# Patient Record
Sex: FEMALE | Race: OTHER | Employment: UNEMPLOYED | URBAN - METROPOLITAN AREA
[De-identification: names, ages, dates, MRNs, and addresses within clinical notes are randomized per-mention and may not be internally consistent; named-entity substitution may affect disease eponyms.]

---

## 2017-01-05 ENCOUNTER — OFFICE VISIT (OUTPATIENT)
Dept: OBGYN CLINIC | Facility: CLINIC | Age: 37
End: 2017-01-05

## 2017-01-05 VITALS — SYSTOLIC BLOOD PRESSURE: 100 MMHG | DIASTOLIC BLOOD PRESSURE: 62 MMHG | WEIGHT: 152 LBS | BODY MASS INDEX: 27 KG/M2

## 2017-01-05 DIAGNOSIS — O02.0 BLIGHTED OVUM: Primary | ICD-10-CM

## 2017-01-05 PROCEDURE — 90471 IMMUNIZATION ADMIN: CPT | Performed by: OBSTETRICS & GYNECOLOGY

## 2017-01-05 PROCEDURE — 99213 OFFICE O/P EST LOW 20 MIN: CPT | Performed by: OBSTETRICS & GYNECOLOGY

## 2017-01-05 PROCEDURE — 90686 IIV4 VACC NO PRSV 0.5 ML IM: CPT | Performed by: OBSTETRICS & GYNECOLOGY

## 2017-01-06 NOTE — PROGRESS NOTES
HPI:    Patient ID: Nawaf Duarte is a 39year old female. HPI  Patient here for F/U after Blighted ovum. [place] is almost negative. No further bleeding now. Desires to conceive soon. All questions answered. May continue with PNV.   This is a 20 minute

## 2017-01-16 ENCOUNTER — TELEPHONE (OUTPATIENT)
Dept: OBGYN CLINIC | Facility: CLINIC | Age: 37
End: 2017-01-16

## 2017-01-16 DIAGNOSIS — Z34.90 EARLY STAGE OF PREGNANCY: Primary | ICD-10-CM

## 2017-01-16 NOTE — TELEPHONE ENCOUNTER
Pt had a miscarriage about a month ago and just recently found out she is pregnant again. States she is suppose to have some test done right away.  Pls adv

## 2017-01-17 ENCOUNTER — APPOINTMENT (OUTPATIENT)
Dept: LAB | Facility: HOSPITAL | Age: 37
End: 2017-01-17
Attending: OBSTETRICS & GYNECOLOGY
Payer: COMMERCIAL

## 2017-01-17 DIAGNOSIS — Z34.90 EARLY STAGE OF PREGNANCY: ICD-10-CM

## 2017-01-17 LAB
B-HCG SERPL-ACNC: 86.2 MIU/ML
RH BLOOD TYPE: POSITIVE

## 2017-01-17 PROCEDURE — 84702 CHORIONIC GONADOTROPIN TEST: CPT

## 2017-01-17 PROCEDURE — 86901 BLOOD TYPING SEROLOGIC RH(D): CPT

## 2017-01-17 PROCEDURE — 86900 BLOOD TYPING SEROLOGIC ABO: CPT

## 2017-01-17 PROCEDURE — 36415 COLL VENOUS BLD VENIPUNCTURE: CPT

## 2017-01-18 DIAGNOSIS — Z34.90 EARLY STAGE OF PREGNANCY: Primary | ICD-10-CM

## 2017-01-24 ENCOUNTER — APPOINTMENT (OUTPATIENT)
Dept: LAB | Facility: HOSPITAL | Age: 37
End: 2017-01-24
Attending: OBSTETRICS & GYNECOLOGY
Payer: COMMERCIAL

## 2017-01-24 DIAGNOSIS — Z34.90 EARLY STAGE OF PREGNANCY: ICD-10-CM

## 2017-01-24 LAB — B-HCG SERPL-ACNC: 879.8 MIU/ML

## 2017-01-24 PROCEDURE — 36415 COLL VENOUS BLD VENIPUNCTURE: CPT

## 2017-01-24 PROCEDURE — 84702 CHORIONIC GONADOTROPIN TEST: CPT

## 2017-01-25 ENCOUNTER — TELEPHONE (OUTPATIENT)
Dept: OBGYN CLINIC | Facility: CLINIC | Age: 37
End: 2017-01-25

## 2017-01-25 DIAGNOSIS — Z34.90 EARLY STAGE OF PREGNANCY: Primary | ICD-10-CM

## 2017-01-25 NOTE — TELEPHONE ENCOUNTER
Spoke to patient, informed patient per MLM. HCG yesterday was 1 which looks very good. Patient informed she is to have a repeat tomorrow around the same time of the day. Order placed in system.  Patient agrees with plan patient verbally understands

## 2017-01-25 NOTE — TELEPHONE ENCOUNTER
----- Message from Olivia Schroeder MD sent at 1/25/2017  8:48 AM CST -----  Quant BHCG is 879 yesterday. This looks vwery good. Have patient repeat Quant BHCG tomorrow around the same time of day.

## 2017-01-26 ENCOUNTER — APPOINTMENT (OUTPATIENT)
Dept: LAB | Facility: HOSPITAL | Age: 37
End: 2017-01-26
Attending: OBSTETRICS & GYNECOLOGY
Payer: COMMERCIAL

## 2017-01-26 DIAGNOSIS — Z34.90 EARLY STAGE OF PREGNANCY: ICD-10-CM

## 2017-01-26 LAB — B-HCG SERPL-ACNC: 2189 MIU/ML

## 2017-01-26 PROCEDURE — 84702 CHORIONIC GONADOTROPIN TEST: CPT

## 2017-01-26 PROCEDURE — 36415 COLL VENOUS BLD VENIPUNCTURE: CPT

## 2017-01-30 ENCOUNTER — TELEPHONE (OUTPATIENT)
Dept: OBGYN CLINIC | Facility: CLINIC | Age: 37
End: 2017-01-30

## 2017-01-30 DIAGNOSIS — Z36.87 UNSURE OF LMP (LAST MENSTRUAL PERIOD) AS REASON FOR ULTRASOUND SCAN: Primary | ICD-10-CM

## 2017-01-31 NOTE — TELEPHONE ENCOUNTER
----- Message from Danie Gaming MD sent at 1/26/2017  9:17 PM CST -----  Patient informed that Silvia Angelina has doubled. Please send in order for OB U/S for Unsure Dates. Call patient and give her Phone number to U/S Dept.   She is to schedule OB U/S s

## 2017-02-03 ENCOUNTER — APPOINTMENT (OUTPATIENT)
Dept: LAB | Facility: HOSPITAL | Age: 37
End: 2017-02-03
Attending: OBSTETRICS & GYNECOLOGY
Payer: COMMERCIAL

## 2017-02-03 ENCOUNTER — HOSPITAL ENCOUNTER (OUTPATIENT)
Dept: ULTRASOUND IMAGING | Age: 37
Discharge: HOME | End: 2017-02-03
Attending: OBSTETRICS & GYNECOLOGY
Payer: COMMERCIAL

## 2017-02-03 ENCOUNTER — TELEPHONE (OUTPATIENT)
Dept: OBGYN CLINIC | Facility: CLINIC | Age: 37
End: 2017-02-03

## 2017-02-03 DIAGNOSIS — O20.0 THREATENED ABORTION, ANTEPARTUM: Primary | ICD-10-CM

## 2017-02-03 DIAGNOSIS — Z34.90 EARLY STAGE OF PREGNANCY: ICD-10-CM

## 2017-02-03 DIAGNOSIS — Z36.87 UNSURE OF LMP (LAST MENSTRUAL PERIOD) AS REASON FOR ULTRASOUND SCAN: ICD-10-CM

## 2017-02-03 LAB — B-HCG SERPL-ACNC: NORMAL MIU/ML

## 2017-02-03 PROCEDURE — 84702 CHORIONIC GONADOTROPIN TEST: CPT

## 2017-02-03 PROCEDURE — 76801 OB US < 14 WKS SINGLE FETUS: CPT

## 2017-02-03 PROCEDURE — 36415 COLL VENOUS BLD VENIPUNCTURE: CPT

## 2017-02-03 PROCEDURE — 76817 TRANSVAGINAL US OBSTETRIC: CPT

## 2017-02-03 NOTE — TELEPHONE ENCOUNTER
Patient informed to go to the lab for a Quant BHCG. Verbalizes understanding and says she will go do this.

## 2017-02-03 NOTE — TELEPHONE ENCOUNTER
The report is not available yet but send the patient to the lab for a Quant BHCG. Diagnosis is early pregnancy.

## 2017-02-05 ENCOUNTER — LAB ENCOUNTER (OUTPATIENT)
Dept: LAB | Facility: HOSPITAL | Age: 37
End: 2017-02-05
Attending: OBSTETRICS & GYNECOLOGY
Payer: COMMERCIAL

## 2017-02-05 DIAGNOSIS — Z34.90 EARLY STAGE OF PREGNANCY: ICD-10-CM

## 2017-02-05 LAB — B-HCG SERPL-ACNC: NORMAL MIU/ML

## 2017-02-05 PROCEDURE — 36415 COLL VENOUS BLD VENIPUNCTURE: CPT

## 2017-02-05 PROCEDURE — 84702 CHORIONIC GONADOTROPIN TEST: CPT

## 2017-02-06 ENCOUNTER — TELEPHONE (OUTPATIENT)
Dept: OBGYN CLINIC | Facility: CLINIC | Age: 37
End: 2017-02-06

## 2017-02-06 DIAGNOSIS — Z34.90 EARLY STAGE OF PREGNANCY: Primary | ICD-10-CM

## 2017-02-06 NOTE — TELEPHONE ENCOUNTER
----- Message from Deon Garces MD sent at 2/3/2017  6:38 PM CST -----  Patient informed that Medina Benitez has risen. Please place another order for a Quant BHCG. Patient to go Sunday to lab. Call patient once order is sent.

## 2017-02-07 ENCOUNTER — TELEPHONE (OUTPATIENT)
Dept: OBGYN CLINIC | Facility: CLINIC | Age: 37
End: 2017-02-07

## 2017-02-13 ENCOUNTER — HOSPITAL ENCOUNTER (OUTPATIENT)
Dept: ULTRASOUND IMAGING | Facility: HOSPITAL | Age: 37
Discharge: HOME | End: 2017-02-13
Attending: OBSTETRICS & GYNECOLOGY
Payer: COMMERCIAL

## 2017-02-13 DIAGNOSIS — Z34.90 EARLY STAGE OF PREGNANCY: ICD-10-CM

## 2017-02-13 PROCEDURE — 76817 TRANSVAGINAL US OBSTETRIC: CPT

## 2017-02-13 PROCEDURE — 76801 OB US < 14 WKS SINGLE FETUS: CPT

## 2017-02-14 ENCOUNTER — TELEPHONE (OUTPATIENT)
Dept: OBGYN CLINIC | Facility: CLINIC | Age: 37
End: 2017-02-14

## 2017-02-14 DIAGNOSIS — O20.0 THREATENED ABORTION, ANTEPARTUM: Primary | ICD-10-CM

## 2017-02-14 NOTE — TELEPHONE ENCOUNTER
Per pt is worried regarding the u/s result. Had a miscarriage in December and has a trip planned for Saturday. Requesting to speak to [facility] to ask further questions.  Per pt preferred number is [phone]

## 2017-02-14 NOTE — TELEPHONE ENCOUNTER
Notes Recorded by Elizabeth Kent MD on 2/13/2017 at 9:12 PM  Patient informed of OB U/S reports.   FHTs + 91 and a small subchorionic bleeding noted.  Please send in order for another F/U OB U/S.  Patient to schedule F/U U/S when she comes back from her

## 2017-02-14 NOTE — TELEPHONE ENCOUNTER
6 WEEKS PREGNANT, PT WOULD LIKE TO SPEAK TO THE NURSE REGARDING THE U/S TEST RESULTS, PT IS VERY CONCERN

## 2017-02-21 ENCOUNTER — HOSPITAL ENCOUNTER (OUTPATIENT)
Dept: ULTRASOUND IMAGING | Facility: HOSPITAL | Age: 37
Discharge: HOME | End: 2017-02-21
Attending: OBSTETRICS & GYNECOLOGY
Payer: COMMERCIAL

## 2017-02-21 ENCOUNTER — TELEPHONE (OUTPATIENT)
Dept: OBGYN CLINIC | Facility: CLINIC | Age: 37
End: 2017-02-21

## 2017-02-21 DIAGNOSIS — O20.0 THREATENED ABORTION, ANTEPARTUM: ICD-10-CM

## 2017-02-21 PROCEDURE — 76801 OB US < 14 WKS SINGLE FETUS: CPT | Performed by: RADIOLOGY

## 2017-02-21 PROCEDURE — 76817 TRANSVAGINAL US OBSTETRIC: CPT | Performed by: RADIOLOGY

## 2017-02-22 ENCOUNTER — TELEPHONE (OUTPATIENT)
Dept: OBGYN CLINIC | Facility: CLINIC | Age: 37
End: 2017-02-22

## 2017-02-22 DIAGNOSIS — O20.0 THREATENED ABORTION, ANTEPARTUM: Primary | ICD-10-CM

## 2017-02-22 NOTE — TELEPHONE ENCOUNTER
----- Message from Marceline Denver, MD sent at 2/22/2017  8:25 AM CST -----  Spoke with patient about OB U/S results. Please order another F/U OB U/S for the patient to do in one week. Diagnosis is Threatened AB.   Call patient to let her know order plac

## 2017-02-22 NOTE — TELEPHONE ENCOUNTER
----- Message from Logan Salgado MD sent at 2/22/2017  8:25 AM CST -----  Spoke with patient about OB U/S results. Please order another F/U OB U/S for the patient to do in one week. Diagnosis is Threatened AB.   Call patient to let her know order plac

## 2017-02-24 ENCOUNTER — TELEPHONE (OUTPATIENT)
Dept: OBGYN CLINIC | Facility: CLINIC | Age: 37
End: 2017-02-24

## 2017-02-24 DIAGNOSIS — O20.0 THREATENED MISCARRIAGE: Primary | ICD-10-CM

## 2017-02-25 ENCOUNTER — APPOINTMENT (OUTPATIENT)
Dept: LAB | Facility: HOSPITAL | Age: 37
End: 2017-02-25
Attending: OBSTETRICS & GYNECOLOGY
Payer: COMMERCIAL

## 2017-02-25 DIAGNOSIS — O20.0 THREATENED MISCARRIAGE: ICD-10-CM

## 2017-02-25 LAB
B-HCG SERPL-ACNC: NORMAL MIU/ML
PROGEST SERPL-MCNC: 10.5 NG/ML

## 2017-02-25 PROCEDURE — 84144 ASSAY OF PROGESTERONE: CPT

## 2017-02-25 PROCEDURE — 84702 CHORIONIC GONADOTROPIN TEST: CPT

## 2017-02-25 PROCEDURE — 36415 COLL VENOUS BLD VENIPUNCTURE: CPT

## 2017-02-25 NOTE — TELEPHONE ENCOUNTER
Pt informed of MLM reply, verbalized understanding. Stated ever since she found out she could have another miscarriage, has been very anxious, crying a lot, slept very little. Still having pregnancy symptoms, breast tenderness, nausea.  First miscarriage

## 2017-02-25 NOTE — TELEPHONE ENCOUNTER
The patient did NOT miscarry. There is still a heart beat. I will call her when I see the next U/S in my Results In-Basket.

## 2017-02-28 ENCOUNTER — HOSPITAL ENCOUNTER (OUTPATIENT)
Dept: ULTRASOUND IMAGING | Facility: HOSPITAL | Age: 37
Discharge: HOME | End: 2017-02-28
Attending: OBSTETRICS & GYNECOLOGY
Payer: COMMERCIAL

## 2017-02-28 ENCOUNTER — OFFICE VISIT (OUTPATIENT)
Dept: OBGYN CLINIC | Facility: CLINIC | Age: 37
End: 2017-02-28

## 2017-02-28 ENCOUNTER — TELEPHONE (OUTPATIENT)
Dept: OBGYN CLINIC | Facility: CLINIC | Age: 37
End: 2017-02-28

## 2017-02-28 VITALS — DIASTOLIC BLOOD PRESSURE: 60 MMHG | SYSTOLIC BLOOD PRESSURE: 96 MMHG

## 2017-02-28 DIAGNOSIS — O20.0 THREATENED ABORTION, ANTEPARTUM: Primary | ICD-10-CM

## 2017-02-28 DIAGNOSIS — O20.0 THREATENED ABORTION, ANTEPARTUM: ICD-10-CM

## 2017-02-28 DIAGNOSIS — O02.1 MISSED ABORTION: Primary | ICD-10-CM

## 2017-02-28 PROCEDURE — 99213 OFFICE O/P EST LOW 20 MIN: CPT | Performed by: OBSTETRICS & GYNECOLOGY

## 2017-02-28 PROCEDURE — 76817 TRANSVAGINAL US OBSTETRIC: CPT

## 2017-02-28 PROCEDURE — 76801 OB US < 14 WKS SINGLE FETUS: CPT

## 2017-02-28 NOTE — TELEPHONE ENCOUNTER
Schedule patient for Suction D & C for Missed Ab for Friday, 3/3/2017. To follow my scheduled case. Remind patient to go to lab the day prior to surgery and to be NPO after midnight.

## 2017-02-28 NOTE — TELEPHONE ENCOUNTER
Starting bleeding bright red this am, like start of period, also having mild back pain. Had apt for u/s then MLM later today, would like to know what to do.      Informed MLM in office, advised pt could have STAT HOLD and call for this am and f/u with him

## 2017-02-28 NOTE — PROGRESS NOTES
HPI:    Patient ID: Ted Moncada is a 39year old female. HPI  Patient here after Hold and Call U/S that confirms a Missed AB. Options reviewed of conservative management vs Suction D & C.   Risks of surgery reviewed including but not limited to injury t alert and oriented to person, place, and time. Skin: Skin is warm and dry. Psychiatric: She has a normal mood and affect. Her behavior is normal. Judgment and thought content normal.   Nursing note and vitals reviewed. Uterus 6 weeks size.            A

## 2017-02-28 NOTE — TELEPHONE ENCOUNTER
Surgery scheduled 3/3/17 following 10:30 case, charted  In the surgery book, lm for a patient to inform of the surgery date and time

## 2017-03-02 ENCOUNTER — TELEPHONE (OUTPATIENT)
Dept: OBGYN CLINIC | Facility: CLINIC | Age: 37
End: 2017-03-02

## 2017-03-02 NOTE — TELEPHONE ENCOUNTER
I contacted patient to return her phone call. Patient has a known 6 week size missed AB. Patient has been having some cramping and spotting with brownish discharge as well.   Discussed spontaneous AB and the option of following conservatively versus a D&C

## 2017-03-02 NOTE — TELEPHONE ENCOUNTER
Pt diagnosed with miscarriage 2/28, scheduled for [facility]  for tomorrow. Pt started today with a little bit of of cramping, 5-7/10 comes and goes, some bleeding yesterday, bright red, a little blood today.  Pt was going to take ibuprofen for pain but was able to

## 2017-03-02 NOTE — TELEPHONE ENCOUNTER
May call OR to cancel D & C for tomorrow but reschedule it for next Wednesday. We can always cancel it again if the patient ends up passing miscarriage spontaneously.

## 2017-03-06 ENCOUNTER — TELEPHONE (OUTPATIENT)
Dept: OBGYN CLINIC | Facility: CLINIC | Age: 37
End: 2017-03-06

## 2017-03-06 DIAGNOSIS — O02.1 MISSED ABORTION: Primary | ICD-10-CM

## 2017-03-06 NOTE — TELEPHONE ENCOUNTER
PT STATE SHE'S HAVING A MISCARRIAGE / SHE WAS TOLD IF SHE DID NOT HAVE IT BY THE WEEK END / TO CALL  BACK / PLS ADV

## 2017-03-07 ENCOUNTER — LAB ENCOUNTER (OUTPATIENT)
Dept: LAB | Facility: HOSPITAL | Age: 37
End: 2017-03-07
Attending: OBSTETRICS & GYNECOLOGY
Payer: COMMERCIAL

## 2017-03-07 DIAGNOSIS — Z34.90 EARLY STAGE OF PREGNANCY: ICD-10-CM

## 2017-03-07 DIAGNOSIS — O02.1 MISSED ABORTION: ICD-10-CM

## 2017-03-07 LAB
ANTIBODY SCREEN: NEGATIVE
B-HCG SERPL-ACNC: NORMAL MIU/ML
BASOPHILS # BLD: 0 K/UL (ref 0–0.2)
BASOPHILS NFR BLD: 0 %
EOSINOPHIL # BLD: 0.1 K/UL (ref 0–0.7)
EOSINOPHIL NFR BLD: 1 %
ERYTHROCYTE [DISTWIDTH] IN BLOOD BY AUTOMATED COUNT: 11.7 % (ref 11–15)
HCT VFR BLD AUTO: 40.8 % (ref 35–48)
HGB BLD-MCNC: 14 G/DL (ref 12–16)
LYMPHOCYTES # BLD: 2.4 K/UL (ref 1–4)
LYMPHOCYTES NFR BLD: 31 %
MCH RBC QN AUTO: 31.8 PG (ref 27–32)
MCHC RBC AUTO-ENTMCNC: 34.2 G/DL (ref 32–37)
MCV RBC AUTO: 92.8 FL (ref 80–100)
MONOCYTES # BLD: 0.4 K/UL (ref 0–1)
MONOCYTES NFR BLD: 6 %
NEUTROPHILS # BLD AUTO: 4.8 K/UL (ref 1.8–7.7)
NEUTROPHILS NFR BLD: 62 %
PLATELET # BLD AUTO: 237 K/UL (ref 140–400)
PMV BLD AUTO: 9.2 FL (ref 7.4–10.3)
RBC # BLD AUTO: 4.39 M/UL (ref 3.7–5.4)
RH BLOOD TYPE: POSITIVE
WBC # BLD AUTO: 7.8 K/UL (ref 4–11)

## 2017-03-07 PROCEDURE — 86901 BLOOD TYPING SEROLOGIC RH(D): CPT

## 2017-03-07 PROCEDURE — 86850 RBC ANTIBODY SCREEN: CPT

## 2017-03-07 PROCEDURE — 36415 COLL VENOUS BLD VENIPUNCTURE: CPT

## 2017-03-07 PROCEDURE — 84702 CHORIONIC GONADOTROPIN TEST: CPT

## 2017-03-07 PROCEDURE — 85025 COMPLETE CBC W/AUTO DIFF WBC: CPT

## 2017-03-07 PROCEDURE — 86900 BLOOD TYPING SEROLOGIC ABO: CPT

## 2017-03-07 NOTE — H&P
[facility]    PATIENT'S NAME: Lucho Walker   ATTENDING PHYSICIAN: Zeeshan Esteban MD   PATIENT ACCOUNT#:   [id number]    LOCATION:  [facility]  MEDICAL RECORD #:   [id number]       YOB: 1980  ADMISSION DATE:       03/08/2017 adenopathy. ABDOMEN:  Soft, nontender. No hepatosplenomegaly. EXTREMITIES:  Lower extremities with no edema. No calf tenderness. PELVIC:  Normal external genitalia. Vagina no lesions, scant bloody discharge. Cervix is closed.   No cervical motion te

## 2017-03-07 NOTE — TELEPHONE ENCOUNTER
Rescheduled suction D&C for tomorrow at 1230, Patient informed and verbalized understanding. Form faxed, placed in book   Pt requested u/s just to make sure, okayed by mlm, ordered, scheduled for 4pm, drink 32oz starting at 230 and hold, diagnostics main.

## 2017-03-08 ENCOUNTER — HOSPITAL ENCOUNTER (OUTPATIENT)
Facility: HOSPITAL | Age: 37
Setting detail: HOSPITAL OUTPATIENT SURGERY
Discharge: HOME | End: 2017-03-08
Attending: OBSTETRICS & GYNECOLOGY | Admitting: OBSTETRICS & GYNECOLOGY
Payer: COMMERCIAL

## 2017-03-08 ENCOUNTER — ANESTHESIA EVENT (OUTPATIENT)
Dept: SURGERY | Facility: HOSPITAL | Age: 37
End: 2017-03-08
Payer: COMMERCIAL

## 2017-03-08 ENCOUNTER — ANESTHESIA (OUTPATIENT)
Dept: SURGERY | Facility: HOSPITAL | Age: 37
End: 2017-03-08
Payer: COMMERCIAL

## 2017-03-08 ENCOUNTER — SURGERY (OUTPATIENT)
Age: 37
End: 2017-03-08

## 2017-03-08 VITALS
RESPIRATION RATE: 16 BRPM | WEIGHT: 138 LBS | TEMPERATURE: 98 F | HEIGHT: 64 IN | OXYGEN SATURATION: 99 % | BODY MASS INDEX: 23.56 KG/M2 | HEART RATE: 62 BPM | SYSTOLIC BLOOD PRESSURE: 113 MMHG | DIASTOLIC BLOOD PRESSURE: 66 MMHG

## 2017-03-08 DIAGNOSIS — O02.1 MISSED ABORTION: Primary | ICD-10-CM

## 2017-03-08 PROCEDURE — 59820 CARE OF MISCARRIAGE: CPT | Performed by: OBSTETRICS & GYNECOLOGY

## 2017-03-08 PROCEDURE — 10D17ZZ EXTRACTION OF PRODUCTS OF CONCEPTION, RETAINED, VIA NATURAL OR ARTIFICIAL OPENING: ICD-10-PCS | Performed by: OBSTETRICS & GYNECOLOGY

## 2017-03-08 RX ORDER — LIDOCAINE HYDROCHLORIDE 10 MG/ML
INJECTION, SOLUTION EPIDURAL; INFILTRATION; INTRACAUDAL; PERINEURAL AS NEEDED
Status: DISCONTINUED | OUTPATIENT
Start: 2017-03-08 | End: 2017-03-08 | Stop reason: SURG

## 2017-03-08 RX ORDER — HYDROMORPHONE HYDROCHLORIDE 1 MG/ML
0.2 INJECTION, SOLUTION INTRAMUSCULAR; INTRAVENOUS; SUBCUTANEOUS EVERY 5 MIN PRN
Status: DISCONTINUED | OUTPATIENT
Start: 2017-03-08 | End: 2017-03-08

## 2017-03-08 RX ORDER — HYDROMORPHONE HYDROCHLORIDE 1 MG/ML
0.6 INJECTION, SOLUTION INTRAMUSCULAR; INTRAVENOUS; SUBCUTANEOUS EVERY 5 MIN PRN
Status: DISCONTINUED | OUTPATIENT
Start: 2017-03-08 | End: 2017-03-08

## 2017-03-08 RX ORDER — FAMOTIDINE 20 MG/1
20 TABLET ORAL ONCE
Status: DISCONTINUED | OUTPATIENT
Start: 2017-03-08 | End: 2017-03-08 | Stop reason: HOSPADM

## 2017-03-08 RX ORDER — DEXAMETHASONE SODIUM PHOSPHATE 4 MG/ML
VIAL (ML) INJECTION AS NEEDED
Status: DISCONTINUED | OUTPATIENT
Start: 2017-03-08 | End: 2017-03-08 | Stop reason: SURG

## 2017-03-08 RX ORDER — ONDANSETRON 4 MG/1
4 TABLET, FILM COATED ORAL EVERY 8 HOURS PRN
Status: DISCONTINUED | OUTPATIENT
Start: 2017-03-08 | End: 2017-03-08

## 2017-03-08 RX ORDER — MISOPROSTOL 200 UG/1
TABLET ORAL AS NEEDED
Status: DISCONTINUED | OUTPATIENT
Start: 2017-03-08 | End: 2017-03-08

## 2017-03-08 RX ORDER — HYDROCODONE BITARTRATE AND ACETAMINOPHEN 5; 325 MG/1; MG/1
1 TABLET ORAL EVERY 6 HOURS PRN
Qty: 30 TABLET | Refills: 0 | Status: SHIPPED | OUTPATIENT
Start: 2017-03-08 | End: 2017-06-30

## 2017-03-08 RX ORDER — MORPHINE SULFATE 4 MG/ML
4 INJECTION, SOLUTION INTRAMUSCULAR; INTRAVENOUS EVERY 10 MIN PRN
Status: DISCONTINUED | OUTPATIENT
Start: 2017-03-08 | End: 2017-03-08

## 2017-03-08 RX ORDER — HYDROMORPHONE HYDROCHLORIDE 1 MG/ML
0.4 INJECTION, SOLUTION INTRAMUSCULAR; INTRAVENOUS; SUBCUTANEOUS EVERY 5 MIN PRN
Status: DISCONTINUED | OUTPATIENT
Start: 2017-03-08 | End: 2017-03-08

## 2017-03-08 RX ORDER — SODIUM CHLORIDE, SODIUM LACTATE, POTASSIUM CHLORIDE, CALCIUM CHLORIDE 600; 310; 30; 20 MG/100ML; MG/100ML; MG/100ML; MG/100ML
INJECTION, SOLUTION INTRAVENOUS CONTINUOUS
Status: DISCONTINUED | OUTPATIENT
Start: 2017-03-08 | End: 2017-03-08

## 2017-03-08 RX ORDER — MORPHINE SULFATE 10 MG/ML
6 INJECTION, SOLUTION INTRAMUSCULAR; INTRAVENOUS EVERY 10 MIN PRN
Status: DISCONTINUED | OUTPATIENT
Start: 2017-03-08 | End: 2017-03-08

## 2017-03-08 RX ORDER — ONDANSETRON 2 MG/ML
4 INJECTION INTRAMUSCULAR; INTRAVENOUS ONCE AS NEEDED
Status: DISCONTINUED | OUTPATIENT
Start: 2017-03-08 | End: 2017-03-08

## 2017-03-08 RX ORDER — OXYTOCIN 10 [USP'U]/ML
INJECTION, SOLUTION INTRAMUSCULAR; INTRAVENOUS AS NEEDED
Status: DISCONTINUED | OUTPATIENT
Start: 2017-03-08 | End: 2017-03-08 | Stop reason: SURG

## 2017-03-08 RX ORDER — IBUPROFEN 600 MG/1
600 TABLET ORAL EVERY 6 HOURS PRN
Qty: 30 TABLET | Refills: 0 | Status: SHIPPED | OUTPATIENT
Start: 2017-03-08 | End: 2017-06-30

## 2017-03-08 RX ORDER — SODIUM CHLORIDE 0.9 % (FLUSH) 0.9 %
10 SYRINGE (ML) INJECTION AS NEEDED
Status: DISCONTINUED | OUTPATIENT
Start: 2017-03-08 | End: 2017-03-08 | Stop reason: HOSPADM

## 2017-03-08 RX ORDER — KETOROLAC TROMETHAMINE 30 MG/ML
INJECTION, SOLUTION INTRAMUSCULAR; INTRAVENOUS AS NEEDED
Status: DISCONTINUED | OUTPATIENT
Start: 2017-03-08 | End: 2017-03-08 | Stop reason: SURG

## 2017-03-08 RX ORDER — NALOXONE HYDROCHLORIDE 0.4 MG/ML
80 INJECTION, SOLUTION INTRAMUSCULAR; INTRAVENOUS; SUBCUTANEOUS AS NEEDED
Status: DISCONTINUED | OUTPATIENT
Start: 2017-03-08 | End: 2017-03-08

## 2017-03-08 RX ORDER — MORPHINE SULFATE 2 MG/ML
2 INJECTION, SOLUTION INTRAMUSCULAR; INTRAVENOUS EVERY 10 MIN PRN
Status: DISCONTINUED | OUTPATIENT
Start: 2017-03-08 | End: 2017-03-08

## 2017-03-08 RX ORDER — HALOPERIDOL 5 MG/ML
0.25 INJECTION INTRAMUSCULAR ONCE AS NEEDED
Status: DISCONTINUED | OUTPATIENT
Start: 2017-03-08 | End: 2017-03-08

## 2017-03-08 RX ORDER — HYDROCODONE BITARTRATE AND ACETAMINOPHEN 5; 325 MG/1; MG/1
1 TABLET ORAL AS NEEDED
Status: DISCONTINUED | OUTPATIENT
Start: 2017-03-08 | End: 2017-03-08

## 2017-03-08 RX ORDER — ACETAMINOPHEN 325 MG/1
650 TABLET ORAL ONCE
Status: COMPLETED | OUTPATIENT
Start: 2017-03-08 | End: 2017-03-08

## 2017-03-08 RX ORDER — METOCLOPRAMIDE 10 MG/1
10 TABLET ORAL ONCE
Status: DISCONTINUED | OUTPATIENT
Start: 2017-03-08 | End: 2017-03-08 | Stop reason: HOSPADM

## 2017-03-08 RX ORDER — MIDAZOLAM HYDROCHLORIDE 1 MG/ML
INJECTION INTRAMUSCULAR; INTRAVENOUS AS NEEDED
Status: DISCONTINUED | OUTPATIENT
Start: 2017-03-08 | End: 2017-03-08 | Stop reason: SURG

## 2017-03-08 RX ORDER — ONDANSETRON 2 MG/ML
4 INJECTION INTRAMUSCULAR; INTRAVENOUS EVERY 8 HOURS PRN
Status: DISCONTINUED | OUTPATIENT
Start: 2017-03-08 | End: 2017-03-08

## 2017-03-08 RX ORDER — HYDROCODONE BITARTRATE AND ACETAMINOPHEN 5; 325 MG/1; MG/1
2 TABLET ORAL AS NEEDED
Status: DISCONTINUED | OUTPATIENT
Start: 2017-03-08 | End: 2017-03-08

## 2017-03-08 RX ADMIN — LIDOCAINE HYDROCHLORIDE 50 MG: 10 INJECTION, SOLUTION EPIDURAL; INFILTRATION; INTRACAUDAL; PERINEURAL at 13:45:00

## 2017-03-08 RX ADMIN — OXYTOCIN 10 UNITS: 10 INJECTION, SOLUTION INTRAMUSCULAR; INTRAVENOUS at 14:02:00

## 2017-03-08 RX ADMIN — DEXAMETHASONE SODIUM PHOSPHATE 4 MG: 4 MG/ML VIAL (ML) INJECTION at 13:45:00

## 2017-03-08 RX ADMIN — MIDAZOLAM HYDROCHLORIDE 2 MG: 1 INJECTION INTRAMUSCULAR; INTRAVENOUS at 13:39:00

## 2017-03-08 RX ADMIN — SODIUM CHLORIDE, SODIUM LACTATE, POTASSIUM CHLORIDE, CALCIUM CHLORIDE: 600; 310; 30; 20 INJECTION, SOLUTION INTRAVENOUS at 14:31:00

## 2017-03-08 RX ADMIN — KETOROLAC TROMETHAMINE 30 MG: 30 INJECTION, SOLUTION INTRAMUSCULAR; INTRAVENOUS at 14:14:00

## 2017-03-08 RX ADMIN — SODIUM CHLORIDE, SODIUM LACTATE, POTASSIUM CHLORIDE, CALCIUM CHLORIDE: 600; 310; 30; 20 INJECTION, SOLUTION INTRAVENOUS at 14:30:00

## 2017-03-08 NOTE — BRIEF OP NOTE
[facility]  Brief Op Note     Simeon Solis Location: OR   [facility] [id number] MRN [id number]   Admission Date 3/8/2017 Operation Date 3/8/2017   Attending Physician Abhijit Chua MD Operating Physician Ming Sampson MD       Pre-Op

## 2017-03-08 NOTE — OPERATIVE REPORT
[facility]    PATIENT'S NAME: Ellismaria r Castle   ATTENDING PHYSICIAN: Zeeshan Bailey MD   OPERATING PHYSICIAN: Logan Bailey MD   PATIENT ACCOUNT#:   [id number]    LOCATION:  [facility]  MEDICAL RECORD #:   [id number]       DATE OF mcg of Cytotec was placed per rectum to help with uterine constriction. The patient was then transported to the recovery room in stable postoperative condition. Dictated By Elizabeth Taylor MD  d: 03/08/2017 14:29:09  t: 03/08/2017 16:17:59  Job [id number] [phone] [id number]

## 2017-03-08 NOTE — INTERVAL H&P NOTE
Pre-op Diagnosis: Missed     The above referenced H&P was reviewed by Melanie Flores. Jia Wadsworth MD on 3/8/2017, the patient was examined and no significant changes have occurred in the patient's condition since the H&P was performed.   I discussed with the

## 2017-03-08 NOTE — ANESTHESIA POSTPROCEDURE EVALUATION
Patient: Jane Darling    Procedure Summary     Date Anesthesia Start Anesthesia Stop Room / Location    03/08/17 1339  [facility] MAIN OR 01 / [facility] MAIN OR       Procedure Diagnosis Surgeon Responsible Provider    DILATION & CURETTAGE SUCTION (N/A Uterus) (missed a

## 2017-03-08 NOTE — ANESTHESIA PREPROCEDURE EVALUATION
Anesthesia PreOp Note    HPI:     Zina Sanches is a 39year old female who presents for preoperative consultation requested by: Jet Zepeda MD    Date of Surgery: 3/8/2017    Procedure(s):  DILATION & CURETTAGE SUCTION  Indication: Missed  Catracho Galarza MD      No current [facility]-ordered outpatient prescriptions on file.       Spinach                 Diarrhea    Family History   Problem Relation Age of Onset   • Thyroid disease Mother    • Lipids Father      hyperlipidemia   • Lipids Other Endo/Other - negative ROS   Abdominal              Anesthesia Plan:   ASA:  2  Plan:   General  Post-op Pain Management: IV analgesics  Informed Consent Plan and Risks Discussed With:  Patient      I have informed Timothy Porter  of the nature of the anesthet

## 2017-03-10 ENCOUNTER — TELEPHONE (OUTPATIENT)
Dept: OBGYN CLINIC | Facility: CLINIC | Age: 37
End: 2017-03-10

## 2017-03-10 NOTE — TELEPHONE ENCOUNTER
The pt set up an appt for a d&c f/up on 3/27/17 in the [facility] location, but she has a few questions. Please advise.

## 2017-03-10 NOTE — TELEPHONE ENCOUNTER
Pt informed okay to have at [facility], no prep needed

## 2017-03-27 ENCOUNTER — OFFICE VISIT (OUTPATIENT)
Dept: FAMILY MEDICINE CLINIC | Facility: CLINIC | Age: 37
End: 2017-03-27

## 2017-03-27 ENCOUNTER — OFFICE VISIT (OUTPATIENT)
Dept: OBGYN CLINIC | Facility: CLINIC | Age: 37
End: 2017-03-27

## 2017-03-27 VITALS
SYSTOLIC BLOOD PRESSURE: 94 MMHG | HEIGHT: 62.5 IN | TEMPERATURE: 97 F | RESPIRATION RATE: 16 BRPM | DIASTOLIC BLOOD PRESSURE: 62 MMHG | HEART RATE: 74 BPM | WEIGHT: 141 LBS | BODY MASS INDEX: 25.3 KG/M2

## 2017-03-27 VITALS — SYSTOLIC BLOOD PRESSURE: 98 MMHG | DIASTOLIC BLOOD PRESSURE: 66 MMHG

## 2017-03-27 DIAGNOSIS — M79.671 RIGHT FOOT PAIN: ICD-10-CM

## 2017-03-27 DIAGNOSIS — E55.9 VITAMIN D DEFICIENCY: ICD-10-CM

## 2017-03-27 DIAGNOSIS — Z00.00 ROUTINE PHYSICAL EXAMINATION: Primary | ICD-10-CM

## 2017-03-27 DIAGNOSIS — Z09 SURGERY FOLLOW-UP: Primary | ICD-10-CM

## 2017-03-27 PROCEDURE — 90632 HEPA VACCINE ADULT IM: CPT | Performed by: FAMILY MEDICINE

## 2017-03-27 PROCEDURE — 99395 PREV VISIT EST AGE 18-39: CPT | Performed by: FAMILY MEDICINE

## 2017-03-27 PROCEDURE — 90471 IMMUNIZATION ADMIN: CPT | Performed by: FAMILY MEDICINE

## 2017-03-27 PROCEDURE — 99024 POSTOP FOLLOW-UP VISIT: CPT | Performed by: OBSTETRICS & GYNECOLOGY

## 2017-03-28 NOTE — PROGRESS NOTES
HPI:    Patient ID: Zina Sanches is a 39year old female. HPI  Patient here for Post-Op visit after Suction D & C for Missed Ab. No C/Os. Going on a short vacation.   Will return for annual exam.  Discussed getting a Recurrent Ab work up with labs and P This Visit:  No prescriptions requested or ordered in this encounter    Imaging & Referrals:  None       #[id number]

## 2017-05-05 ENCOUNTER — TELEPHONE (OUTPATIENT)
Dept: OBGYN CLINIC | Facility: CLINIC | Age: 37
End: 2017-05-05

## 2017-05-05 NOTE — TELEPHONE ENCOUNTER
Pt has appt on 5/8/17 with Dr. Magdaleno Oliver, wants to know if he needs any lab work done before then.  Please advise as she would like to go tomorrow

## 2017-05-05 NOTE — TELEPHONE ENCOUNTER
Explained to pt that she has lab orders not drawn from Dr Alex Ashraf, but none ordered by Dr Galilea Alvarez.  Advised pt to discuss w/ Dr Galilea Alvarez at her office visit on Monday what he'd like to have done & then she can have all labs drawn at once or she could have t

## 2017-05-08 ENCOUNTER — OFFICE VISIT (OUTPATIENT)
Dept: OBGYN CLINIC | Facility: CLINIC | Age: 37
End: 2017-05-08

## 2017-05-08 VITALS — WEIGHT: 155 LBS | BODY MASS INDEX: 28 KG/M2 | SYSTOLIC BLOOD PRESSURE: 92 MMHG | DIASTOLIC BLOOD PRESSURE: 60 MMHG

## 2017-05-08 DIAGNOSIS — N96 HISTORY OF RECURRENT MISCARRIAGES: Primary | ICD-10-CM

## 2017-05-08 DIAGNOSIS — Z01.419 ENCOUNTER FOR GYNECOLOGICAL EXAMINATION WITHOUT ABNORMAL FINDING: ICD-10-CM

## 2017-05-08 PROCEDURE — 99395 PREV VISIT EST AGE 18-39: CPT | Performed by: OBSTETRICS & GYNECOLOGY

## 2017-05-08 NOTE — PROGRESS NOTES
HPI:    Patient ID: Alfredo Henson is a 39year old female. HPI  Patient here for routine exam.  Came back for [facility] one month ago.  did not go due to [place] precautions. Patient desires to go for testing due to recurrent miscarriages.   Orders plac and uterus normal. No vaginal discharge found. Cervix:  No CMT. No lesions. Adnexa:  No adnexal masses. NT. Musculoskeletal: Normal range of motion. Lymphadenopathy:     She has no cervical adenopathy.    Neurological: She is alert and oriented t

## 2017-05-26 ENCOUNTER — TELEPHONE (OUTPATIENT)
Dept: OBGYN CLINIC | Facility: CLINIC | Age: 37
End: 2017-05-26

## 2017-05-26 ENCOUNTER — LAB ENCOUNTER (OUTPATIENT)
Dept: LAB | Facility: HOSPITAL | Age: 37
End: 2017-05-26
Attending: OBSTETRICS & GYNECOLOGY
Payer: COMMERCIAL

## 2017-05-26 DIAGNOSIS — E55.9 VITAMIN D DEFICIENCY: ICD-10-CM

## 2017-05-26 DIAGNOSIS — N96 HISTORY OF RECURRENT MISCARRIAGES: ICD-10-CM

## 2017-05-26 DIAGNOSIS — Z00.00 ROUTINE PHYSICAL EXAMINATION: ICD-10-CM

## 2017-05-26 DIAGNOSIS — N96 RECURRENT PREGNANCY LOSS WITHOUT CURRENT PREGNANCY: Primary | ICD-10-CM

## 2017-05-26 DIAGNOSIS — Z34.90 EARLY STAGE OF PREGNANCY: ICD-10-CM

## 2017-05-26 PROCEDURE — 85390 FIBRINOLYSINS SCREEN I&R: CPT

## 2017-05-26 PROCEDURE — 85610 PROTHROMBIN TIME: CPT

## 2017-05-26 PROCEDURE — 82306 VITAMIN D 25 HYDROXY: CPT

## 2017-05-26 PROCEDURE — 85027 COMPLETE CBC AUTOMATED: CPT

## 2017-05-26 PROCEDURE — 86780 TREPONEMA PALLIDUM: CPT

## 2017-05-26 PROCEDURE — 84439 ASSAY OF FREE THYROXINE: CPT

## 2017-05-26 PROCEDURE — 86038 ANTINUCLEAR ANTIBODIES: CPT

## 2017-05-26 PROCEDURE — 84443 ASSAY THYROID STIM HORMONE: CPT

## 2017-05-26 PROCEDURE — 36415 COLL VENOUS BLD VENIPUNCTURE: CPT

## 2017-05-26 PROCEDURE — 86039 ANTINUCLEAR ANTIBODIES (ANA): CPT

## 2017-05-26 PROCEDURE — 86147 CARDIOLIPIN ANTIBODY EA IG: CPT

## 2017-05-26 PROCEDURE — 85613 RUSSELL VIPER VENOM DILUTED: CPT

## 2017-05-26 PROCEDURE — 80061 LIPID PANEL: CPT

## 2017-05-26 PROCEDURE — 80053 COMPREHEN METABOLIC PANEL: CPT

## 2017-05-26 PROCEDURE — 84702 CHORIONIC GONADOTROPIN TEST: CPT

## 2017-05-26 PROCEDURE — 85730 THROMBOPLASTIN TIME PARTIAL: CPT

## 2017-05-30 DIAGNOSIS — E55.9 VITAMIN D DEFICIENCY: Primary | ICD-10-CM

## 2017-05-30 RX ORDER — ERGOCALCIFEROL 1.25 MG/1
50000 CAPSULE ORAL WEEKLY
Qty: 8 CAPSULE | Refills: 0 | Status: SHIPPED | OUTPATIENT
Start: 2017-05-30 | End: 2017-06-29

## 2017-06-07 ENCOUNTER — TELEPHONE (OUTPATIENT)
Dept: OBGYN CLINIC | Facility: CLINIC | Age: 37
End: 2017-06-07

## 2017-06-07 NOTE — TELEPHONE ENCOUNTER
Pt recvd results on mychart and is very concerned because one of the tests came back positive  and she does not understand what they the result means and now she is very concerned because no one has called her she would like the nurse to call and explain t

## 2017-06-07 NOTE — TELEPHONE ENCOUNTER
Patient informed of + RAJWINDER but all other testing WNL. Recommend Baby ASA daily. To start prior to next conception. Also discussed F/U with Rheumatologist.  Patient expresses understanding and agrees.

## 2017-06-07 NOTE — TELEPHONE ENCOUNTER
Pt wants to know her lab results from 5/26/17, especially why is the RAJWINDER Direct Screen is strong positive. Please advise.

## 2017-06-08 ENCOUNTER — PATIENT MESSAGE (OUTPATIENT)
Dept: RHEUMATOLOGY | Facility: CLINIC | Age: 37
End: 2017-06-08

## 2017-06-08 NOTE — TELEPHONE ENCOUNTER
Please see message below. Completed booked at [facility] on 6/16. 6/28 is first consult opening at [address] [place] [address]. Pt is scheduled on 6/30 at [facility], Please advise.

## 2017-06-08 NOTE — TELEPHONE ENCOUNTER
From: Rajeev Lal  To: Ruth Ann Baeza MD  Sent: 6/8/2017 12:54 PM CDT  Subject: Non-Urgent Medical Question    Dear Dr. Dereck Acosta - My name is Johnson Miller, I am a patient of Dr. Gloria Brenner.  I recently had an RAJWINDER done that came back positive, but all of my

## 2017-06-09 ENCOUNTER — TELEPHONE (OUTPATIENT)
Dept: FAMILY MEDICINE CLINIC | Facility: CLINIC | Age: 37
End: 2017-06-09

## 2017-06-09 NOTE — TELEPHONE ENCOUNTER
Pt states she received test results via [employer], results from Dr. Bety Enrique and Dr. Azeb Elizabeth. Pt concerned about RAJWINDER results received, showed strong positive result, test was ordered by Dr. Azeb Elizabeth. Pt was confused about RAJWINDER screening being positive and RAJWINDER t

## 2017-06-12 ENCOUNTER — TELEPHONE (OUTPATIENT)
Dept: OBGYN CLINIC | Facility: CLINIC | Age: 37
End: 2017-06-12

## 2017-06-13 ENCOUNTER — TELEPHONE (OUTPATIENT)
Dept: RHEUMATOLOGY | Facility: CLINIC | Age: 37
End: 2017-06-13

## 2017-06-13 NOTE — TELEPHONE ENCOUNTER
Pt is calling state that she work down town and she can't make it at that time  Pt want to know if another this week she can be added on   Pt is requesting a call back

## 2017-06-30 ENCOUNTER — OFFICE VISIT (OUTPATIENT)
Dept: RHEUMATOLOGY | Facility: CLINIC | Age: 37
End: 2017-06-30

## 2017-06-30 ENCOUNTER — APPOINTMENT (OUTPATIENT)
Dept: LAB | Age: 37
End: 2017-06-30
Attending: INTERNAL MEDICINE
Payer: COMMERCIAL

## 2017-06-30 VITALS
WEIGHT: 144 LBS | HEIGHT: 62.5 IN | BODY MASS INDEX: 25.84 KG/M2 | HEART RATE: 82 BPM | SYSTOLIC BLOOD PRESSURE: 101 MMHG | DIASTOLIC BLOOD PRESSURE: 69 MMHG

## 2017-06-30 DIAGNOSIS — R76.8 POSITIVE ANA (ANTINUCLEAR ANTIBODY): Primary | ICD-10-CM

## 2017-06-30 DIAGNOSIS — R76.8 POSITIVE ANA (ANTINUCLEAR ANTIBODY): ICD-10-CM

## 2017-06-30 LAB
C3 SERPL-MCNC: 123 MG/DL (ref 88–201)
C4 SERPL-MCNC: 22 MG/DL (ref 18–55)

## 2017-06-30 PROCEDURE — 86160 COMPLEMENT ANTIGEN: CPT

## 2017-06-30 PROCEDURE — 86235 NUCLEAR ANTIGEN ANTIBODY: CPT

## 2017-06-30 PROCEDURE — 99244 OFF/OP CNSLTJ NEW/EST MOD 40: CPT | Performed by: INTERNAL MEDICINE

## 2017-06-30 PROCEDURE — 99212 OFFICE O/P EST SF 10 MIN: CPT | Performed by: INTERNAL MEDICINE

## 2017-06-30 PROCEDURE — 86225 DNA ANTIBODY NATIVE: CPT

## 2017-06-30 PROCEDURE — 36415 COLL VENOUS BLD VENIPUNCTURE: CPT

## 2017-06-30 PROCEDURE — 86146 BETA-2 GLYCOPROTEIN ANTIBODY: CPT

## 2017-06-30 RX ORDER — CHOLECALCIFEROL (VITAMIN D3) 1250 MCG
1 CAPSULE ORAL WEEKLY
COMMUNITY

## 2017-06-30 NOTE — PROGRESS NOTES
Dear Harrison Solis:    I saw your patient Madonna Renee in consultation this afternoon at your request, for evaluation of miscarriages and positive RAJWINDER. As you know, she is a 44-year-old woman who has a 11year-old child at home, who is doing well.   December of 2016 During her last 6 week pregnancy she had a lot of gas, diarrhea, which went away. No blood in her stools were no trouble urinating. No dry eyes or mouth. No Raynaud's. No headache. Physical exam:  Pleasant woman in no acute distress. Blood pressure 1

## 2017-07-03 LAB
BETA-2 GLYCOPROTEIN I AB, IGG: 1 SGU
BETA-2 GLYCOPROTEIN I AB, IGM: 4 SMU

## 2017-07-04 LAB
ENA SM IGG SER QL: NEGATIVE
ENA SM+RNP AB SER QL: NEGATIVE
ENA SS-A AB SER QL IA: NEGATIVE
ENA SS-B AB SER QL IA: NEGATIVE

## 2017-07-05 LAB — DSDNA AB TITR SER: <10 {TITER}

## 2017-07-07 ENCOUNTER — TELEPHONE (OUTPATIENT)
Dept: RHEUMATOLOGY | Facility: CLINIC | Age: 37
End: 2017-07-07

## 2017-07-07 NOTE — TELEPHONE ENCOUNTER
I called Alisonne Cockayne with the results of her blood tests. Her specific lupus tests came back negative, including SSA, SSB, Hurtado, RNP, and double-stranded DNA antibodies. C3 and C4 complements were normal.  Beta-2 glycoprotein antibody was negative.     I reassu

## 2017-07-17 ENCOUNTER — PATIENT MESSAGE (OUTPATIENT)
Dept: OBGYN CLINIC | Facility: CLINIC | Age: 37
End: 2017-07-17

## 2017-07-17 NOTE — TELEPHONE ENCOUNTER
From: Nhi Arnett  To: Tho Franklin MD  Sent: 7/17/2017 3:26 PM CDT  Subject: Non-Urgent Deny Mahmood –   I hope you are doing well and enjoying this summer!  Not sure if I need to make an appointment to go see you regarding

## 2017-07-19 NOTE — TELEPHONE ENCOUNTER
Pt stated she is having regular periods, pt wants to try getting preg in august.  Pt to call back for any questions.

## 2017-08-28 ENCOUNTER — TELEPHONE (OUTPATIENT)
Dept: PEDIATRICS CLINIC | Facility: CLINIC | Age: 37
End: 2017-08-28

## 2017-08-28 ENCOUNTER — PATIENT MESSAGE (OUTPATIENT)
Dept: FAMILY MEDICINE CLINIC | Facility: CLINIC | Age: 37
End: 2017-08-28

## 2017-08-28 DIAGNOSIS — Z87.59 HISTORY OF MISCARRIAGE: Primary | ICD-10-CM

## 2017-08-28 NOTE — TELEPHONE ENCOUNTER
Spoke with pt and informed orders for quant BHCG and serum progesterone have been placed. Pt voiced understanding.  Per pt Dr. Juanita Santiago recommended baby ASA about 4 months ago before she was seen by the Rheumatologist. Pt informed Dr. Juanita Santiago reviewed note

## 2017-08-28 NOTE — TELEPHONE ENCOUNTER
Pt was told to contact our office if she became pregnant. LMP 08/03. Had positive preg test on Saturday.   Pt stated she has hx of 2 missed Abs in past, was informed by MLM she may need progesterone and beta hcg checked also asing how soon can she begin ba

## 2017-08-28 NOTE — TELEPHONE ENCOUNTER
Pt states she is pregnant and would like to speak to a nurse before lydia.  Pt has appt with dr Hoda Mendoza 9/5 @45

## 2017-08-28 NOTE — TELEPHONE ENCOUNTER
May send for Quant BHCG and Serum Progesterone now. Please ask the patient which Dr recommended the Baby ASA?   Was it MFM or the Rheumatologist?

## 2017-08-29 NOTE — TELEPHONE ENCOUNTER
From: Juvencio Peña  To: Reji Lauren DO  Sent: 8/28/2017 1:16 PM CDT  Subject: Non-Urgent Casie Olson - I hope you are doing well. I am done taking the prescribed vitamin D medicine.  And I have been taking 2,000 daily since t

## 2017-08-30 ENCOUNTER — TELEPHONE (OUTPATIENT)
Dept: OBGYN CLINIC | Facility: CLINIC | Age: 37
End: 2017-08-30

## 2017-08-30 ENCOUNTER — APPOINTMENT (OUTPATIENT)
Dept: LAB | Age: 37
End: 2017-08-30
Attending: OBSTETRICS & GYNECOLOGY
Payer: COMMERCIAL

## 2017-08-30 DIAGNOSIS — Z87.59 HISTORY OF MISCARRIAGE: ICD-10-CM

## 2017-08-30 DIAGNOSIS — Z87.59 HISTORY OF MISCARRIAGE: Primary | ICD-10-CM

## 2017-08-30 LAB
B-HCG SERPL-ACNC: 12.3 MIU/ML
PROGEST SERPL-MCNC: 1.5 NG/ML

## 2017-08-30 PROCEDURE — 84702 CHORIONIC GONADOTROPIN TEST: CPT

## 2017-08-30 PROCEDURE — 36415 COLL VENOUS BLD VENIPUNCTURE: CPT

## 2017-08-30 PROCEDURE — 84144 ASSAY OF PROGESTERONE: CPT

## 2017-08-30 NOTE — TELEPHONE ENCOUNTER
Ordered standing order for beta hcg and progesterone suppositories per MLM. Pt asked she is unsure if to take the progesterone.  Per pt if its a bad pregnancy she doesn't think shed want to go through with using it, but will use if MLM thinks it may hel

## 2017-08-30 NOTE — TELEPHONE ENCOUNTER
Spoke with pt who states she called [facility] and was told progesterone suppositories are on back order and will not be available until Friday.  Pt states she called [facility] and was told they have progesterone suppositories available and pt will li

## 2017-08-30 NOTE — TELEPHONE ENCOUNTER
Pt at lab right now having labs drawn. Appointment rescheduled to 8/31 at 9;00 am with mlm. Pt requesting note to be off work the rest of the week. Please advise.

## 2017-08-30 NOTE — TELEPHONE ENCOUNTER
Spoke with pt and informed letter has been generated and she can access letter through [employer]. Pt agreed and voiced understanding. Pt requesting labs drawn this afternoon. Pls advise.

## 2017-08-30 NOTE — TELEPHONE ENCOUNTER
Pt tearful, had positive pregnancy test Monday 8/28 and feels she would be about 4 weeks pregnant, but she began having lower back pain last night, and hot flashes. This morning, she took a pregnancy test and it came back negative.  Pt voices she still has

## 2017-08-30 NOTE — TELEPHONE ENCOUNTER
See Dr Arslan Nettles telephone message from 8/28. Patient should go for the blood work that has been ordered. She should schedule appt with Dr. Noemi Bautista soon. Please forward the patient's message to Dr. Arslan Nettles for any additional recommendations.

## 2017-08-30 NOTE — TELEPHONE ENCOUNTER
4 wks-pt states she thinks she's having a miscarriage. she's starting to have a lot of pain. Did another pregnancy test and now is negative.  Has had a miscarriage in the past.

## 2017-08-31 ENCOUNTER — TELEPHONE (OUTPATIENT)
Dept: PEDIATRICS CLINIC | Facility: CLINIC | Age: 37
End: 2017-08-31

## 2017-08-31 NOTE — TELEPHONE ENCOUNTER
No, just the Quant BHCG because she is already on the Progesterone suppositories. Once you are on the suppositories twice a day the Progesterone level does not matter. We would not insert more if it would be low. That is the whole treatment.

## 2017-09-01 ENCOUNTER — APPOINTMENT (OUTPATIENT)
Dept: LAB | Age: 37
End: 2017-09-01
Attending: OBSTETRICS & GYNECOLOGY
Payer: COMMERCIAL

## 2017-09-01 DIAGNOSIS — Z87.59 HISTORY OF MISCARRIAGE: ICD-10-CM

## 2017-09-01 DIAGNOSIS — R10.84 GENERALIZED ABDOMINAL PAIN: ICD-10-CM

## 2017-09-01 DIAGNOSIS — E55.9 VITAMIN D DEFICIENCY: ICD-10-CM

## 2017-09-01 LAB — B-HCG SERPL-ACNC: 3.4 MIU/ML

## 2017-09-01 PROCEDURE — 36415 COLL VENOUS BLD VENIPUNCTURE: CPT

## 2017-09-01 PROCEDURE — 86255 FLUORESCENT ANTIBODY SCREEN: CPT

## 2017-09-01 PROCEDURE — 82306 VITAMIN D 25 HYDROXY: CPT

## 2017-09-01 PROCEDURE — 84702 CHORIONIC GONADOTROPIN TEST: CPT

## 2017-09-01 PROCEDURE — 83516 IMMUNOASSAY NONANTIBODY: CPT

## 2017-09-02 ENCOUNTER — TELEPHONE (OUTPATIENT)
Dept: OBGYN CLINIC | Facility: CLINIC | Age: 37
End: 2017-09-02

## 2017-09-02 NOTE — TELEPHONE ENCOUNTER
Per pt she knows she is having a miscarriage, having severe bleeding, Is going through her pad and under wear and clothes.

## 2017-09-02 NOTE — TELEPHONE ENCOUNTER
Pt voices she had brownish vaginal discharge last night, and this morning, she turned over in bed and noticed a gush of vaginal discharge.  Pt voices she saturated through a sanitary pad, her underwear, and there was a small amount of blood on her bed sheet

## 2017-09-05 ENCOUNTER — OFFICE VISIT (OUTPATIENT)
Dept: OBGYN CLINIC | Facility: CLINIC | Age: 37
End: 2017-09-05

## 2017-09-05 ENCOUNTER — APPOINTMENT (OUTPATIENT)
Dept: LAB | Facility: HOSPITAL | Age: 37
End: 2017-09-05
Attending: OBSTETRICS & GYNECOLOGY
Payer: COMMERCIAL

## 2017-09-05 VITALS
DIASTOLIC BLOOD PRESSURE: 66 MMHG | BODY MASS INDEX: 25 KG/M2 | SYSTOLIC BLOOD PRESSURE: 96 MMHG | HEART RATE: 86 BPM | WEIGHT: 141.38 LBS

## 2017-09-05 DIAGNOSIS — N96 HISTORY OF RECURRENT MISCARRIAGES: ICD-10-CM

## 2017-09-05 DIAGNOSIS — Z87.59 HISTORY OF MISCARRIAGE: ICD-10-CM

## 2017-09-05 DIAGNOSIS — N96 HISTORY OF RECURRENT MISCARRIAGES: Primary | ICD-10-CM

## 2017-09-05 LAB
25(OH)D3 SERPL-MCNC: 35.8 NG/ML
B-HCG SERPL-ACNC: 0.9 MIU/ML

## 2017-09-05 PROCEDURE — 81291 MTHFR GENE: CPT

## 2017-09-05 PROCEDURE — 99213 OFFICE O/P EST LOW 20 MIN: CPT | Performed by: OBSTETRICS & GYNECOLOGY

## 2017-09-05 PROCEDURE — 84702 CHORIONIC GONADOTROPIN TEST: CPT

## 2017-09-05 PROCEDURE — 36415 COLL VENOUS BLD VENIPUNCTURE: CPT

## 2017-09-05 NOTE — PROGRESS NOTES
HPI:    Patient ID: Nancy Cunningham is a 39year old female. HPI  Patient here for F/U of Early complete miscarriage c/w a blighted ovum. To go to lab today for another Quant BHCG that will probably be negative today. Patient states menses started.   Discu Skin is warm and dry. Psychiatric: She has a normal mood and affect. Her behavior is normal. Judgment and thought content normal.   Nursing note and vitals reviewed.              ASSESSMENT/PLAN:   History of recurrent miscarriages  (primary encounter mick

## 2017-09-06 LAB
TTG IGA SER-ACNC: 0.4 U/ML (ref ?–7)
TTG IGG SER-ACNC: <0.6 U/ML (ref ?–7)

## 2017-09-07 LAB — MTHFR MUTATION: C.1286A>C: NEGATIVE

## 2017-09-14 ENCOUNTER — TELEPHONE (OUTPATIENT)
Dept: OTHER | Age: 37
End: 2017-09-14

## 2017-09-14 DIAGNOSIS — Z87.59 HISTORY OF MISCARRIAGE: Primary | ICD-10-CM

## 2017-09-14 NOTE — TELEPHONE ENCOUNTER
Spoke with pt and verified . Pt states she just had her third miscarriage a couple of weeks ago. She had an OV with reproductive endocrinologist who mentioned her thyroid levels may be effecting her reproductive issues.     Pt is asking if she can ha

## 2017-09-19 ENCOUNTER — TELEPHONE (OUTPATIENT)
Dept: RHEUMATOLOGY | Facility: CLINIC | Age: 37
End: 2017-09-19

## 2017-09-19 NOTE — TELEPHONE ENCOUNTER
An autoimmune cause for infertility was ruled out. Her lupus and phospholipid antibody tests are all negative. The fertility specialist will order these coagulation tests if necessary. Please let her know. Thank you.

## 2017-09-19 NOTE — TELEPHONE ENCOUNTER
Spoke with pt and given provider's message below. Pt verbalized understanding and agreeable with plan.

## 2017-09-19 NOTE — TELEPHONE ENCOUNTER
Pt questioning if she needs to have V Leiden factor checked?   Said she had her 3rd miscarriage    Also wants to discuss FR mutation       Requesting call before noon if possible - has appt with specialist

## 2017-12-04 ENCOUNTER — HOSPITAL ENCOUNTER (OUTPATIENT)
Age: 37
Discharge: HOME | End: 2017-12-04
Attending: FAMILY MEDICINE
Payer: COMMERCIAL

## 2017-12-04 VITALS
SYSTOLIC BLOOD PRESSURE: 121 MMHG | OXYGEN SATURATION: 99 % | TEMPERATURE: 98 F | RESPIRATION RATE: 18 BRPM | HEART RATE: 97 BPM | DIASTOLIC BLOOD PRESSURE: 77 MMHG

## 2017-12-04 DIAGNOSIS — S30.0XXA CONTUSION OF LOWER BACK AND PELVIS, INITIAL ENCOUNTER: Primary | ICD-10-CM

## 2017-12-04 PROCEDURE — 99212 OFFICE O/P EST SF 10 MIN: CPT

## 2017-12-04 PROCEDURE — 99203 OFFICE O/P NEW LOW 30 MIN: CPT

## 2017-12-04 RX ORDER — ENOXAPARIN SODIUM 150 MG/ML
40 INJECTION SUBCUTANEOUS EVERY 12 HOURS
COMMUNITY
End: 2018-01-03

## 2017-12-04 RX ORDER — PREDNISONE 10 MG/1
10 TABLET ORAL DAILY
COMMUNITY
End: 2018-01-31

## 2017-12-04 NOTE — ED INITIAL ASSESSMENT (HPI)
Pt here with spouse , pt states she fell this morning she slipped down a step and fell back and hit her lower right back on the edge of one of the steps, pt states she is 5 weeks pregnant she went to see her ob/gyn this morning and has an ultrasound done ,

## 2017-12-04 NOTE — ED NOTES
Pt discharged home with  , pt instructed to monitor for any  bleeding or bruising and to go the ER immediately if any of this occurs pt verbalizes understanding

## 2017-12-04 NOTE — ED PROVIDER NOTES
Patient Seen in: [address]    History   Patient presents with:  Fall (musculoskeletal, neurologic)    Stated Complaint: BACK & LEG PAIN FROM FALL    HPI    HPI: Teagan Mclaughlin is a 39year old female who presents after an Cholecalciferol (VITAMIN D3) 57137 units Oral Cap,  Take 1 tablet by mouth once a week. Cetirizine-Pseudoephedrine (ZYRTEC-D OR),  Take 1 tablet by mouth daily.      Progesterone (FIRST-PROGESTERONE ) 100 MG Vaginal Suppos,  Place 100 mg vaginally intact. SKIN: No open wounds, no rashes. PSYCH: Normal affect. Calm and cooperative.     Differential diagnosis to include fracture vs. Strain/sprain vs. contusion        ED Course   Labs Reviewed - No data to display    MDM     Discuss risks of falling w

## 2018-01-03 ENCOUNTER — OFFICE VISIT (OUTPATIENT)
Dept: OBGYN CLINIC | Facility: CLINIC | Age: 38
End: 2018-01-03

## 2018-01-03 ENCOUNTER — TELEPHONE (OUTPATIENT)
Dept: OBGYN CLINIC | Facility: CLINIC | Age: 38
End: 2018-01-03

## 2018-01-03 VITALS
BODY MASS INDEX: 23.9 KG/M2 | DIASTOLIC BLOOD PRESSURE: 62 MMHG | HEIGHT: 64 IN | WEIGHT: 140 LBS | SYSTOLIC BLOOD PRESSURE: 100 MMHG

## 2018-01-03 DIAGNOSIS — O09.521 ELDERLY MULTIGRAVIDA IN FIRST TRIMESTER: Primary | ICD-10-CM

## 2018-01-03 DIAGNOSIS — N96 HISTORY OF RECURRENT MISCARRIAGES: ICD-10-CM

## 2018-01-03 DIAGNOSIS — N92.6 MISSED MENSES: ICD-10-CM

## 2018-01-03 DIAGNOSIS — O03.9 MISCARRIAGE: ICD-10-CM

## 2018-01-03 PROCEDURE — 99213 OFFICE O/P EST LOW 20 MIN: CPT | Performed by: OBSTETRICS & GYNECOLOGY

## 2018-01-03 RX ORDER — METFORMIN HYDROCHLORIDE 500 MG/1
TABLET, EXTENDED RELEASE ORAL
Refills: 3 | COMMUNITY
Start: 2017-11-16 | End: 2018-01-31

## 2018-01-03 RX ORDER — ENOXAPARIN SODIUM 100 MG/ML
INJECTION SUBCUTANEOUS
COMMUNITY
Start: 2017-12-18 | End: 2021-07-21

## 2018-01-03 NOTE — TELEPHONE ENCOUNTER
Per pt she is 10 weeks pregnant now, currently seen the Reproductive eccrinology , was told she needs to be seen in 2 weeks and also needs a referral for MFM, pt would like to see Kelly qiu. Oneal Claude

## 2018-01-03 NOTE — PROGRESS NOTES
HPI:    Patient ID: Simeon Bradley is a 40year old female. HPI  Patient here for PCV. H/O Recurrent Ab. Seeing GEOFFREY at [facility]. Has Heterozygous Factor 2 and Heterozygous MTHFR. Currently on Lovanox and Progesterone. Patient is AMA.   Will give referral t She is oriented to person, place, and time. She appears well-developed and well-nourished. Neurological: She is alert and oriented to person, place, and time. Skin: Skin is warm and dry. Psychiatric: She has a normal mood and affect.  Her behavior is

## 2018-01-10 ENCOUNTER — NURSE ONLY (OUTPATIENT)
Dept: OBGYN CLINIC | Facility: CLINIC | Age: 38
End: 2018-01-10

## 2018-01-10 DIAGNOSIS — Z34.81 ENCOUNTER FOR SUPERVISION OF OTHER NORMAL PREGNANCY IN FIRST TRIMESTER: Primary | ICD-10-CM

## 2018-01-17 ENCOUNTER — HOSPITAL ENCOUNTER (OUTPATIENT)
Dept: PERINATAL CARE | Facility: HOSPITAL | Age: 38
Discharge: HOME | End: 2018-01-17
Attending: OBSTETRICS & GYNECOLOGY
Payer: COMMERCIAL

## 2018-01-17 VITALS — SYSTOLIC BLOOD PRESSURE: 114 MMHG | HEART RATE: 111 BPM | DIASTOLIC BLOOD PRESSURE: 67 MMHG

## 2018-01-17 DIAGNOSIS — Z36.9 FIRST TRIMESTER SCREENING: ICD-10-CM

## 2018-01-17 DIAGNOSIS — Z15.89 MTHFR MUTATION: ICD-10-CM

## 2018-01-17 DIAGNOSIS — O09.521 ELDERLY MULTIGRAVIDA IN FIRST TRIMESTER: ICD-10-CM

## 2018-01-17 DIAGNOSIS — O09.521 ELDERLY MULTIGRAVIDA IN FIRST TRIMESTER: Primary | ICD-10-CM

## 2018-01-17 DIAGNOSIS — O26.20 RECURRENT PREGNANCY LOSS, CURRENTLY PREGNANT: ICD-10-CM

## 2018-01-17 DIAGNOSIS — O09.299 H/O POSTPARTUM HEMORRHAGE, CURRENTLY PREGNANT: ICD-10-CM

## 2018-01-17 DIAGNOSIS — D68.52 PROTHROMBIN GENE MUTATION (HCC): ICD-10-CM

## 2018-01-17 PROCEDURE — 99214 OFFICE O/P EST MOD 30 MIN: CPT | Performed by: OBSTETRICS & GYNECOLOGY

## 2018-01-17 PROCEDURE — 76813 OB US NUCHAL MEAS 1 GEST: CPT | Performed by: OBSTETRICS & GYNECOLOGY

## 2018-01-17 NOTE — PROGRESS NOTES
Shelley Lyons is a 40year old female. Reason for Consult:   AMA. Panorama test was drawn 2 days ago. 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 Currently on Lovenox 40mg daily. Doing well.   No complaints mouth daily. Take 1-2 tabs daily Disp:  Rfl:    Coenzyme Q10 (CO Q 10 OR) Take 1 tablet by mouth daily. Disp:  Rfl:    Linolenic Acid (OMEGA 3 OR) Take 1 tablet by mouth daily.  Disp:  Rfl:    Prenatal Multivit-Min-Fe-FA (PRE-MAHESH OR) Take 1 tablet by mout only to the increased number of women aged 28 to 39, but also to later marriage, second marriage, the availability of better contraceptive options, and wider opportunities for further education and career advancement.     Studies have generally shown good p The two most common medical problems complicating pregnancy are hypertension and diabetes.    The incidence of preeclampsia in the general obstetric population is 3 to 4 percent; this increases to 5 to 10 percent in women over age 36 and is as high as 28 p is one of the most frustrating and difficult areas in reproductive medicine because the etiology is often unknown. RPL classically refers to the occurrence of three or more consecutive losses of clinically recognized pregnancies prior to the 20th week. loss in both early and late pregnancy. This mutation is often not associated with elevated homocysteine levels and is not as thrombogenic.    Late fetal loss was strongly associated with the presence of one of the thrombophilias in combination with homozyg in controls.   The lifetime probability of developing thrombosis compared to those with no defect was 8.5 times higher for carriers of protein S deficiency, 8.1 for antithrombin deficiency, 7.3 for protein C deficiency, and 2.2 for carriers of factor V Leid 17 percent of the cases and 1 percent of controls. All late fetal losses in this group of women were associated with absence of folic acid supplementation during pregnancy.          FIRST TRIMESTER SCREENING:    The CRL is consistent with the gestational ag

## 2018-01-17 NOTE — PROGRESS NOTES
Pt for 1st tri screen  Hx AMA MTHFR and infertility   Panorama drawn 1/15/18  Denies pregnancy complaints

## 2018-01-25 ENCOUNTER — TELEPHONE (OUTPATIENT)
Dept: OBGYN CLINIC | Facility: CLINIC | Age: 38
End: 2018-01-25

## 2018-01-25 NOTE — TELEPHONE ENCOUNTER
We typically do not recheck progesterone levels after prog suppositories are stopped by the physician( it appears dr Pedro Willoughby already told her this based on the note from [facility]),  But pt may have a ob 1 ultrasound to recheck pregnancy if pt wants this.

## 2018-01-25 NOTE — TELEPHONE ENCOUNTER
Pt was on progesterone vaginal suppositories with this pregnancy. Last suppository was last night.  Dr. Alvarado Joshi told pt her own body will begin to hormonally support her pregnancy once she stops the progesterone, but pt is still nervous and would like to kno

## 2018-01-25 NOTE — TELEPHONE ENCOUNTER
Informed pt of Dr. Arabella akins's message below. Pt voices understanding. Order for OB 1 U/S order sent to [facility] and order of confirmation received. Pt voices she is going to talk to her  to see if she wants to have done.

## 2018-01-29 ENCOUNTER — TELEPHONE (OUTPATIENT)
Dept: PEDIATRICS CLINIC | Facility: CLINIC | Age: 38
End: 2018-01-29

## 2018-01-29 ENCOUNTER — MED REC SCAN ONLY (OUTPATIENT)
Dept: OBGYN CLINIC | Facility: CLINIC | Age: 38
End: 2018-01-29

## 2018-01-29 DIAGNOSIS — O20.0 THREATENED ABORTION: Primary | ICD-10-CM

## 2018-01-31 ENCOUNTER — INITIAL PRENATAL (OUTPATIENT)
Dept: OBGYN CLINIC | Facility: CLINIC | Age: 38
End: 2018-01-31

## 2018-01-31 ENCOUNTER — LAB ENCOUNTER (OUTPATIENT)
Dept: LAB | Facility: HOSPITAL | Age: 38
End: 2018-01-31
Attending: OBSTETRICS & GYNECOLOGY
Payer: COMMERCIAL

## 2018-01-31 ENCOUNTER — HOSPITAL ENCOUNTER (OUTPATIENT)
Dept: PERINATAL CARE | Facility: HOSPITAL | Age: 38
Discharge: HOME | End: 2018-01-31
Attending: OBSTETRICS & GYNECOLOGY
Payer: COMMERCIAL

## 2018-01-31 VITALS
SYSTOLIC BLOOD PRESSURE: 103 MMHG | WEIGHT: 144 LBS | DIASTOLIC BLOOD PRESSURE: 70 MMHG | HEART RATE: 96 BPM | BODY MASS INDEX: 25 KG/M2

## 2018-01-31 VITALS — SYSTOLIC BLOOD PRESSURE: 125 MMHG | HEART RATE: 111 BPM | DIASTOLIC BLOOD PRESSURE: 76 MMHG

## 2018-01-31 DIAGNOSIS — Z34.81 ENCOUNTER FOR SUPERVISION OF OTHER NORMAL PREGNANCY IN FIRST TRIMESTER: ICD-10-CM

## 2018-01-31 DIAGNOSIS — Z34.82 ENCOUNTER FOR SUPERVISION OF OTHER NORMAL PREGNANCY IN SECOND TRIMESTER: Primary | ICD-10-CM

## 2018-01-31 DIAGNOSIS — O09.522 ELDERLY MULTIGRAVIDA IN SECOND TRIMESTER: ICD-10-CM

## 2018-01-31 DIAGNOSIS — O26.891 VAGINAL DISCHARGE DURING PREGNANCY IN FIRST TRIMESTER: ICD-10-CM

## 2018-01-31 DIAGNOSIS — O26.899 VAGINAL DISCHARGE DURING PREGNANCY: ICD-10-CM

## 2018-01-31 DIAGNOSIS — O26.899 VAGINAL DISCHARGE DURING PREGNANCY: Primary | ICD-10-CM

## 2018-01-31 DIAGNOSIS — N89.8 VAGINAL DISCHARGE DURING PREGNANCY: Primary | ICD-10-CM

## 2018-01-31 DIAGNOSIS — N89.8 VAGINAL DISCHARGE DURING PREGNANCY IN FIRST TRIMESTER: ICD-10-CM

## 2018-01-31 DIAGNOSIS — N89.8 VAGINAL DISCHARGE DURING PREGNANCY: ICD-10-CM

## 2018-01-31 DIAGNOSIS — O20.0 THREATENED ABORTION: ICD-10-CM

## 2018-01-31 DIAGNOSIS — O09.521 ELDERLY MULTIGRAVIDA IN FIRST TRIMESTER: ICD-10-CM

## 2018-01-31 LAB
ANTIBODY SCREEN: NEGATIVE
BACTERIA UR QL AUTO: NEGATIVE /HPF
BASOPHILS # BLD: 0 K/UL (ref 0–0.2)
BASOPHILS NFR BLD: 0 %
BILIRUB UR QL: NEGATIVE
COLOR UR: YELLOW
EOSINOPHIL # BLD: 0 K/UL (ref 0–0.7)
EOSINOPHIL NFR BLD: 1 %
ERYTHROCYTE [DISTWIDTH] IN BLOOD BY AUTOMATED COUNT: 12.8 % (ref 11–15)
GLUCOSE UR-MCNC: NEGATIVE MG/DL
HBV SURFACE AG SERPL QL IA: NONREACTIVE
HCT VFR BLD AUTO: 35.9 % (ref 35–48)
HCV AB SERPL QL IA: NONREACTIVE
HGB BLD-MCNC: 12.8 G/DL (ref 12–16)
HGB UR QL STRIP.AUTO: NEGATIVE
HIV1+2 AB SERPL QL IA: NONREACTIVE
KETONES UR-MCNC: NEGATIVE MG/DL
LEUKOCYTE ESTERASE UR QL STRIP.AUTO: NEGATIVE
LYMPHOCYTES # BLD: 1.6 K/UL (ref 1–4)
LYMPHOCYTES NFR BLD: 20 %
MCH RBC QN AUTO: 33.9 PG (ref 27–32)
MCHC RBC AUTO-ENTMCNC: 35.5 G/DL (ref 32–37)
MCV RBC AUTO: 95.6 FL (ref 80–100)
MONOCYTES # BLD: 0.4 K/UL (ref 0–1)
MONOCYTES NFR BLD: 5 %
MULTISTIX LOT#: NORMAL NUMERIC
NEUTROPHILS # BLD AUTO: 6.3 K/UL (ref 1.8–7.7)
NEUTROPHILS NFR BLD: 75 %
NITRITE UR QL STRIP.AUTO: NEGATIVE
PH UR: 8 [PH] (ref 5–8)
PH, URINE: 7 (ref 4.5–8)
PLATELET # BLD AUTO: 204 K/UL (ref 140–400)
PMV BLD AUTO: 9.2 FL (ref 7.4–10.3)
PROT UR-MCNC: NEGATIVE MG/DL
RBC # BLD AUTO: 3.76 M/UL (ref 3.7–5.4)
RBC #/AREA URNS AUTO: <1 /HPF
RH BLOOD TYPE: POSITIVE
RUBV IGG SER-ACNC: 140.8 IU/ML
SP GR UR STRIP: 1.01 (ref 1–1.03)
SPECIFIC GRAVITY: 1 (ref 1–1.03)
T PALLIDUM AB SER QL: NEGATIVE
TSH SERPL-ACNC: 1.98 UIU/ML (ref 0.45–5.33)
UROBILINOGEN UR STRIP-ACNC: <2
UROBILINOGEN,SEMI-QN: 0 MG/DL (ref 0–1.9)
VIT C UR-MCNC: NEGATIVE MG/DL
WBC # BLD AUTO: 8.4 K/UL (ref 4–11)
WBC #/AREA URNS AUTO: <1 /HPF

## 2018-01-31 PROCEDURE — 84443 ASSAY THYROID STIM HORMONE: CPT

## 2018-01-31 PROCEDURE — 81220 CFTR GENE COM VARIANTS: CPT

## 2018-01-31 PROCEDURE — 86803 HEPATITIS C AB TEST: CPT

## 2018-01-31 PROCEDURE — 86762 RUBELLA ANTIBODY: CPT

## 2018-01-31 PROCEDURE — 85025 COMPLETE CBC W/AUTO DIFF WBC: CPT

## 2018-01-31 PROCEDURE — 81003 URINALYSIS AUTO W/O SCOPE: CPT

## 2018-01-31 PROCEDURE — 36415 COLL VENOUS BLD VENIPUNCTURE: CPT

## 2018-01-31 PROCEDURE — 86900 BLOOD TYPING SEROLOGIC ABO: CPT

## 2018-01-31 PROCEDURE — 87340 HEPATITIS B SURFACE AG IA: CPT

## 2018-01-31 PROCEDURE — 81002 URINALYSIS NONAUTO W/O SCOPE: CPT | Performed by: OBSTETRICS & GYNECOLOGY

## 2018-01-31 PROCEDURE — 99213 OFFICE O/P EST LOW 20 MIN: CPT | Performed by: OBSTETRICS & GYNECOLOGY

## 2018-01-31 PROCEDURE — 87389 HIV-1 AG W/HIV-1&-2 AB AG IA: CPT

## 2018-01-31 PROCEDURE — 87086 URINE CULTURE/COLONY COUNT: CPT

## 2018-01-31 PROCEDURE — 86780 TREPONEMA PALLIDUM: CPT

## 2018-01-31 PROCEDURE — 86901 BLOOD TYPING SEROLOGIC RH(D): CPT

## 2018-01-31 PROCEDURE — 86850 RBC ANTIBODY SCREEN: CPT

## 2018-01-31 PROCEDURE — 76801 OB US < 14 WKS SINGLE FETUS: CPT | Performed by: OBSTETRICS & GYNECOLOGY

## 2018-01-31 NOTE — PROGRESS NOTES
Ted Moncada is a 40year old female. Reason for Consult:   AMA. Panorama test was low risk. 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 Currently on Lovenox 40mg daily.     Doing well. No complaints.   Alfonzo Wilder Cholecalciferol (VITAMIN D3) 54536 units Oral Cap Take 1 tablet by mouth once a week. Disp:  Rfl:    Cetirizine-Pseudoephedrine (ZYRTEC-D OR) Take 1 tablet by mouth daily.    Disp:  Rfl:         HISTORY:  Past Medical History:   Diagnosis Date   • Ammy increased risk of chromosomal abnormalities associated with advanced maternal age at age 40year old. She understands that ultrasound exam cannot exclude potential genetic abnormalities.   Her estimated risk based on maternal age at amniocentesis with any c

## 2018-01-31 NOTE — PROGRESS NOTES
GC/Chlamydia Culture Done. Saw MFM today. To order MSAFP next visit. Panorama is normal.  Continue Lovanox injections daily.

## 2018-01-31 NOTE — PROGRESS NOTES
Pt for U/S  Pt concerned about pregnancy with stopping progesterone   Denies pregnancy complaints  Panorama low risk girl per pt

## 2018-02-01 LAB
C TRACH DNA SPEC QL NAA+PROBE: NEGATIVE
N GONORRHOEA DNA SPEC QL NAA+PROBE: NEGATIVE

## 2018-02-05 ENCOUNTER — TELEPHONE (OUTPATIENT)
Dept: OBGYN CLINIC | Facility: CLINIC | Age: 38
End: 2018-02-05

## 2018-02-05 ENCOUNTER — TELEPHONE (OUTPATIENT)
Dept: OTHER | Age: 38
End: 2018-02-05
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
## 2018-02-06 LAB
CYSTIC FIBROSIS ALLELE 1: NEGATIVE
CYSTIC FIBROSIS, ALLELE 2: NEGATIVE

## 2018-02-06 NOTE — TELEPHONE ENCOUNTER
15 months pregnant, did you mean 5? She should be ok especially if she is asymptomatic now. If she wants, you can have her come in before 4 and I can take a look.

## 2018-02-06 NOTE — TELEPHONE ENCOUNTER
Called patient - verified patient's name and  - informed pt of doctor's note - patient verbalized understanding but declines to make an appt at this time.

## 2018-02-21 ENCOUNTER — ROUTINE PRENATAL (OUTPATIENT)
Dept: OBGYN CLINIC | Facility: CLINIC | Age: 38
End: 2018-02-21

## 2018-02-21 ENCOUNTER — APPOINTMENT (OUTPATIENT)
Dept: LAB | Facility: HOSPITAL | Age: 38
End: 2018-02-21
Attending: OBSTETRICS & GYNECOLOGY
Payer: COMMERCIAL

## 2018-02-21 VITALS
SYSTOLIC BLOOD PRESSURE: 96 MMHG | WEIGHT: 144 LBS | DIASTOLIC BLOOD PRESSURE: 64 MMHG | HEART RATE: 106 BPM | BODY MASS INDEX: 25 KG/M2

## 2018-02-21 DIAGNOSIS — Z34.92 ENCOUNTER FOR SUPERVISION OF NORMAL PREGNANCY IN SECOND TRIMESTER, UNSPECIFIED GRAVIDITY: ICD-10-CM

## 2018-02-21 DIAGNOSIS — Z34.92 ENCOUNTER FOR SUPERVISION OF NORMAL PREGNANCY IN SECOND TRIMESTER, UNSPECIFIED GRAVIDITY: Primary | ICD-10-CM

## 2018-02-21 PROBLEM — O26.20: Status: ACTIVE | Noted: 2018-02-21

## 2018-02-21 PROBLEM — O26.20 PREVIOUS RECURRENT MISCARRIAGES AFFECTING PREGNANCY, ANTEPARTUM: Status: ACTIVE | Noted: 2018-02-21

## 2018-02-21 PROBLEM — Z15.89 MTHFR GENE MUTATION: Status: ACTIVE | Noted: 2018-02-21

## 2018-02-21 LAB
GLUCOSE (URINE DIPSTICK): 100 MG/DL
MULTISTIX LOT#: NORMAL NUMERIC
PH, URINE: 7 (ref 4.5–8)
SPECIFIC GRAVITY: 1 (ref 1–1.03)

## 2018-02-21 PROCEDURE — 36415 COLL VENOUS BLD VENIPUNCTURE: CPT

## 2018-02-21 PROCEDURE — 81002 URINALYSIS NONAUTO W/O SCOPE: CPT | Performed by: OBSTETRICS & GYNECOLOGY

## 2018-02-21 PROCEDURE — 82105 ALPHA-FETOPROTEIN SERUM: CPT

## 2018-02-21 NOTE — PROGRESS NOTES
Poor appetite. No wt gain this mo. Counseled. Conceived w assistance of GEOFFREY from [facility]. On Lovenox 40 mg sq daily and baby ASA. Stopped Metformin and progesterone oral and vaginal.  Order MSAFP  Going today.

## 2018-02-27 ENCOUNTER — TELEPHONE (OUTPATIENT)
Dept: OBGYN CLINIC | Facility: CLINIC | Age: 38
End: 2018-02-27

## 2018-02-27 ENCOUNTER — ROUTINE PRENATAL (OUTPATIENT)
Dept: OBGYN CLINIC | Facility: CLINIC | Age: 38
End: 2018-02-27

## 2018-02-27 VITALS
DIASTOLIC BLOOD PRESSURE: 65 MMHG | WEIGHT: 145.19 LBS | HEART RATE: 111 BPM | SYSTOLIC BLOOD PRESSURE: 95 MMHG | BODY MASS INDEX: 25 KG/M2

## 2018-02-27 DIAGNOSIS — Z34.82 ENCOUNTER FOR SUPERVISION OF OTHER NORMAL PREGNANCY IN SECOND TRIMESTER: Primary | ICD-10-CM

## 2018-02-27 LAB
APPEARANCE: CLEAR
MULTISTIX LOT#: NORMAL NUMERIC
PH, URINE: 7 (ref 4.5–8)
SPECIFIC GRAVITY: 1 (ref 1–1.03)
UROBILINOGEN,SEMI-QN: 0.2 MG/DL (ref 0–1.9)

## 2018-02-27 PROCEDURE — 81002 URINALYSIS NONAUTO W/O SCOPE: CPT | Performed by: OBSTETRICS & GYNECOLOGY

## 2018-02-27 NOTE — PROGRESS NOTES
Problem Visit:  Had cramping and diarrhea this morning. Has been Gluten free for nine months but had bread this past Sunday. Patient's GI tract most likely had difficulty processing bread. Denies vaginal bleeding.   Noted vaginal discharge this AM after

## 2018-02-27 NOTE — TELEPHONE ENCOUNTER
Pt 17 weeks reporting she felt cramping on her way to work and after having BM cramping resolved, but then noticed a paola sized amount of clear/whitish d/c. Denied further cramping, no recent intercourse or other vaginal symptoms.   Concerned this was her

## 2018-02-27 NOTE — TELEPHONE ENCOUNTER
Pt is 18 wks prgt and states she had some cramping.    Also states she had a it of discharge  Pt also states she is high risked

## 2018-03-03 LAB
MATERNAL AGE OF DELIVERY: 37.6 YR
MOM FOR AFP: 0.96
PATIENT'S AFP: 35 NG/ML

## 2018-03-07 ENCOUNTER — TELEPHONE (OUTPATIENT)
Dept: OBGYN CLINIC | Facility: CLINIC | Age: 38
End: 2018-03-07

## 2018-03-14 ENCOUNTER — HOSPITAL ENCOUNTER (OUTPATIENT)
Dept: PERINATAL CARE | Facility: HOSPITAL | Age: 38
Discharge: HOME | End: 2018-03-14
Attending: OBSTETRICS & GYNECOLOGY
Payer: COMMERCIAL

## 2018-03-14 DIAGNOSIS — O09.299 H/O POSTPARTUM HEMORRHAGE, CURRENTLY PREGNANT: ICD-10-CM

## 2018-03-14 DIAGNOSIS — O09.522 ELDERLY MULTIGRAVIDA IN SECOND TRIMESTER: Primary | ICD-10-CM

## 2018-03-14 DIAGNOSIS — O09.522 ELDERLY MULTIGRAVIDA IN SECOND TRIMESTER: ICD-10-CM

## 2018-03-14 DIAGNOSIS — Z15.89 MTHFR GENE MUTATION: ICD-10-CM

## 2018-03-14 DIAGNOSIS — O26.20 PREVIOUS RECURRENT MISCARRIAGES AFFECTING PREGNANCY, ANTEPARTUM: ICD-10-CM

## 2018-03-14 PROCEDURE — 76811 OB US DETAILED SNGL FETUS: CPT | Performed by: OBSTETRICS & GYNECOLOGY

## 2018-03-14 PROCEDURE — 99213 OFFICE O/P EST LOW 20 MIN: CPT | Performed by: OBSTETRICS & GYNECOLOGY

## 2018-03-14 NOTE — PROGRESS NOTES
Pt for level 2 US  HX ama  MTHFR  Prothrombin gene mutation  Denies pregnancy complaints  States active fetus

## 2018-03-14 NOTE — PROGRESS NOTES
Zina Sanches is a 40year old female. Reason for Consult:   AMA. Panorama test was low risk. 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 Currently on Lovenox 40mg daily.     Doing well. No complaints.     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   Face: eyes normal, profile normal, nose normal, lip normal, palate normal.  Heart: visualized and normal appearance: 3 vessel view,

## 2018-03-21 ENCOUNTER — ROUTINE PRENATAL (OUTPATIENT)
Dept: OBGYN CLINIC | Facility: CLINIC | Age: 38
End: 2018-03-21

## 2018-03-21 VITALS — BODY MASS INDEX: 25 KG/M2 | DIASTOLIC BLOOD PRESSURE: 64 MMHG | WEIGHT: 148 LBS | SYSTOLIC BLOOD PRESSURE: 112 MMHG

## 2018-03-21 DIAGNOSIS — Z34.82 ENCOUNTER FOR SUPERVISION OF OTHER NORMAL PREGNANCY IN SECOND TRIMESTER: Primary | ICD-10-CM

## 2018-03-21 LAB
APPEARANCE: CLEAR
MULTISTIX LOT#: NORMAL NUMERIC
PH, URINE: 6 (ref 4.5–8)
SPECIFIC GRAVITY: 1 (ref 1–1.03)
URINE-COLOR: YELLOW
UROBILINOGEN,SEMI-QN: 0.2 MG/DL (ref 0–1.9)

## 2018-03-21 PROCEDURE — 81002 URINALYSIS NONAUTO W/O SCOPE: CPT | Performed by: OBSTETRICS & GYNECOLOGY

## 2018-04-19 ENCOUNTER — ROUTINE PRENATAL (OUTPATIENT)
Dept: OBGYN CLINIC | Facility: CLINIC | Age: 38
End: 2018-04-19

## 2018-04-19 VITALS
DIASTOLIC BLOOD PRESSURE: 65 MMHG | WEIGHT: 152 LBS | HEART RATE: 99 BPM | SYSTOLIC BLOOD PRESSURE: 104 MMHG | BODY MASS INDEX: 26 KG/M2

## 2018-04-19 DIAGNOSIS — O09.529 SUPERVISION OF ELDERLY MULTIGRAVIDA, UNSPECIFIED TRIMESTER: Primary | ICD-10-CM

## 2018-04-19 DIAGNOSIS — Z34.82 ENCOUNTER FOR SUPERVISION OF OTHER NORMAL PREGNANCY IN SECOND TRIMESTER: ICD-10-CM

## 2018-04-19 PROCEDURE — 81002 URINALYSIS NONAUTO W/O SCOPE: CPT | Performed by: OBSTETRICS & GYNECOLOGY

## 2018-04-19 NOTE — PROGRESS NOTES
[facility]  Obstetrics and Gynecology  Prenatal Visit  Anup Loza MD    MAURICE Moncada is a 40year old.o. [id number] 24w5d weeks. Here for routine prenatal visit and is without complaints.    Patient denies any regular uterine contractions, spon normal pregnancy in second trimester  Plan: URINALYSIS NONAUTO W/O SCOPE, CBC WITH         DIFFERENTIAL WITH PLATELET, GLUCOSE 1HR OB    Plan   Order placed for 50 g Glucola and CBC, patient to go for these labs in 2-3 weeks.   I reviewed patient's medicati

## 2018-04-25 ENCOUNTER — HOSPITAL ENCOUNTER (OUTPATIENT)
Dept: PERINATAL CARE | Facility: HOSPITAL | Age: 38
Discharge: HOME | End: 2018-04-25
Attending: OBSTETRICS & GYNECOLOGY
Payer: COMMERCIAL

## 2018-04-25 VITALS — SYSTOLIC BLOOD PRESSURE: 124 MMHG | DIASTOLIC BLOOD PRESSURE: 70 MMHG

## 2018-04-25 DIAGNOSIS — O26.20 PREVIOUS RECURRENT MISCARRIAGES AFFECTING PREGNANCY, ANTEPARTUM: ICD-10-CM

## 2018-04-25 DIAGNOSIS — Z15.89 MTHFR GENE MUTATION: ICD-10-CM

## 2018-04-25 DIAGNOSIS — O09.299 H/O POSTPARTUM HEMORRHAGE, CURRENTLY PREGNANT: ICD-10-CM

## 2018-04-25 DIAGNOSIS — O09.522 ELDERLY MULTIGRAVIDA IN SECOND TRIMESTER: ICD-10-CM

## 2018-04-25 DIAGNOSIS — Z15.89 MTHFR GENE MUTATION: Primary | ICD-10-CM

## 2018-04-25 DIAGNOSIS — D68.52 PROTHROMBIN GENE MUTATION (HCC): ICD-10-CM

## 2018-04-25 PROCEDURE — 99213 OFFICE O/P EST LOW 20 MIN: CPT | Performed by: OBSTETRICS & GYNECOLOGY

## 2018-04-25 PROCEDURE — 76805 OB US >/= 14 WKS SNGL FETUS: CPT | Performed by: OBSTETRICS & GYNECOLOGY

## 2018-04-25 NOTE — PROGRESS NOTES
César Eli is a 40year old female. Reason for Consult:   AMA. Panorama test was low risk. 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 Currently on Lovenox 40mg daily.     Doing well. No complaints.     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

## 2018-04-30 ENCOUNTER — HOSPITAL ENCOUNTER (OUTPATIENT)
Age: 38
Discharge: HOME | End: 2018-04-30
Attending: FAMILY MEDICINE
Payer: COMMERCIAL

## 2018-04-30 ENCOUNTER — NURSE TRIAGE (OUTPATIENT)
Dept: OTHER | Age: 38
End: 2018-04-30

## 2018-04-30 VITALS
RESPIRATION RATE: 16 BRPM | TEMPERATURE: 98 F | BODY MASS INDEX: 25.78 KG/M2 | SYSTOLIC BLOOD PRESSURE: 108 MMHG | WEIGHT: 151 LBS | HEIGHT: 64 IN | HEART RATE: 102 BPM | DIASTOLIC BLOOD PRESSURE: 59 MMHG | OXYGEN SATURATION: 100 %

## 2018-04-30 DIAGNOSIS — S80.212A ABRASION OF LEFT KNEE, INITIAL ENCOUNTER: Primary | ICD-10-CM

## 2018-04-30 PROCEDURE — 99212 OFFICE O/P EST SF 10 MIN: CPT

## 2018-04-30 NOTE — TELEPHONE ENCOUNTER
Action Requested: Summary for Provider     []  Critical Lab, Recommendations Needed  [] Need Additional Advice  []   FYI    []   Need Orders  [] Need Medications Sent to Pharmacy  []  Other     SUMMARY: I/C f/u Pt stated that on Friday she lost her balance

## 2018-04-30 NOTE — ED INITIAL ASSESSMENT (HPI)
Pt here with complaints of left knee pain, pt state she fell getting out of her car on Friday and landed on her left knee, pt is 26 weeks pregnant and denies any injury to her head or abd,

## 2018-04-30 NOTE — ED PROVIDER NOTES
Patient Seen in: [address]    History   Patient presents with:  Musculoskeletal Problem    Stated Complaint: lt knee pain    HPI    Pt is a 41 yo 6 month pregnant female on Lovenox and ASA who fell 4 days ago onto her l Normocephalic. Abdominal:   Doppler with . Neurological: She is alert and oriented to person, place, and time. Skin: Skin is warm. Left knee abrasion, granulating well, no pus, no redness. Psychiatric: She has a normal mood and affect.  Her b

## 2018-05-01 NOTE — TELEPHONE ENCOUNTER
Called to follow-up with patient after immediate care visit. Patient states her left knee is better. Immediate care cleaned knee with normal saline and covered with bandage. No signs/symptoms of infection present per patient.      Encouraged pat

## 2018-05-04 ENCOUNTER — LAB ENCOUNTER (OUTPATIENT)
Dept: LAB | Age: 38
End: 2018-05-04
Attending: OBSTETRICS & GYNECOLOGY
Payer: COMMERCIAL

## 2018-05-04 DIAGNOSIS — Z34.82 ENCOUNTER FOR SUPERVISION OF OTHER NORMAL PREGNANCY IN SECOND TRIMESTER: ICD-10-CM

## 2018-05-04 PROCEDURE — 36415 COLL VENOUS BLD VENIPUNCTURE: CPT

## 2018-05-04 PROCEDURE — 85025 COMPLETE CBC W/AUTO DIFF WBC: CPT

## 2018-05-04 PROCEDURE — 82950 GLUCOSE TEST: CPT

## 2018-05-07 ENCOUNTER — TELEPHONE (OUTPATIENT)
Dept: OBGYN CLINIC | Facility: CLINIC | Age: 38
End: 2018-05-07

## 2018-05-07 DIAGNOSIS — Z34.83 ENCOUNTER FOR SUPERVISION OF OTHER NORMAL PREGNANCY IN THIRD TRIMESTER: Primary | ICD-10-CM

## 2018-05-07 NOTE — TELEPHONE ENCOUNTER
Message   Received: Yesterday   Message Contents   Alexx Tang MD  [facility] Clinical Staff             Please notify patient of elevated 50 gram glucola and have her scheduled for a 3 hr GTT.

## 2018-05-07 NOTE — TELEPHONE ENCOUNTER
05/07/2018 Pt was informed about her 1 hour Glucose test results. Pt verbalized understanding. Pt did not have any further questions. 3hr Glucose test order was placed on [facility].

## 2018-05-09 ENCOUNTER — LAB ENCOUNTER (OUTPATIENT)
Dept: LAB | Facility: HOSPITAL | Age: 38
End: 2018-05-09
Attending: OBSTETRICS & GYNECOLOGY
Payer: COMMERCIAL

## 2018-05-09 ENCOUNTER — TELEPHONE (OUTPATIENT)
Dept: OBGYN CLINIC | Facility: CLINIC | Age: 38
End: 2018-05-09

## 2018-05-09 DIAGNOSIS — Z34.83 ENCOUNTER FOR SUPERVISION OF OTHER NORMAL PREGNANCY IN THIRD TRIMESTER: ICD-10-CM

## 2018-05-09 PROCEDURE — 82951 GLUCOSE TOLERANCE TEST (GTT): CPT

## 2018-05-09 PROCEDURE — 36415 COLL VENOUS BLD VENIPUNCTURE: CPT

## 2018-05-09 PROCEDURE — 82952 GTT-ADDED SAMPLES: CPT

## 2018-05-16 ENCOUNTER — ROUTINE PRENATAL (OUTPATIENT)
Dept: OBGYN CLINIC | Facility: CLINIC | Age: 38
End: 2018-05-16

## 2018-05-16 VITALS — DIASTOLIC BLOOD PRESSURE: 60 MMHG | SYSTOLIC BLOOD PRESSURE: 102 MMHG | BODY MASS INDEX: 27 KG/M2 | WEIGHT: 154.81 LBS

## 2018-05-16 DIAGNOSIS — Z34.82 ENCOUNTER FOR SUPERVISION OF OTHER NORMAL PREGNANCY IN SECOND TRIMESTER: Primary | ICD-10-CM

## 2018-05-16 DIAGNOSIS — Z23 NEED FOR VACCINATION: ICD-10-CM

## 2018-05-16 PROBLEM — D68.2 FACTOR II DEFICIENCY (HCC): Status: ACTIVE | Noted: 2018-05-16

## 2018-05-16 PROCEDURE — 81002 URINALYSIS NONAUTO W/O SCOPE: CPT | Performed by: ADVANCED PRACTICE MIDWIFE

## 2018-05-16 RX ORDER — BREAST PUMP
EACH MISCELLANEOUS
Qty: 1 EACH | Refills: 0 | Status: SHIPPED | OUTPATIENT
Start: 2018-05-16 | End: 2021-07-21

## 2018-05-16 RX ORDER — BREAST PUMP
EACH MISCELLANEOUS
Qty: 1 EACH | Refills: 0 | Status: SHIPPED | OUTPATIENT
Start: 2018-05-16 | End: 2018-05-16

## 2018-05-16 NOTE — PROGRESS NOTES
S denies HA, vision change, URQ pain, swelling. Baby is active its a girl,  EDPS 7. 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 Is seeing a therapist for depression after all her miscarriages. Her psychologist recommended a  as well.     O.  See kinav

## 2018-05-23 ENCOUNTER — HOSPITAL ENCOUNTER (OUTPATIENT)
Dept: PERINATAL CARE | Facility: HOSPITAL | Age: 38
Discharge: HOME | End: 2018-05-23
Attending: OBSTETRICS & GYNECOLOGY
Payer: COMMERCIAL

## 2018-05-23 VITALS — HEART RATE: 103 BPM | SYSTOLIC BLOOD PRESSURE: 97 MMHG | DIASTOLIC BLOOD PRESSURE: 64 MMHG

## 2018-05-23 DIAGNOSIS — Z15.89 MTHFR GENE MUTATION: ICD-10-CM

## 2018-05-23 DIAGNOSIS — O09.299 H/O POSTPARTUM HEMORRHAGE, CURRENTLY PREGNANT: ICD-10-CM

## 2018-05-23 DIAGNOSIS — D68.2 FACTOR II DEFICIENCY (HCC): ICD-10-CM

## 2018-05-23 DIAGNOSIS — O09.523 ELDERLY MULTIGRAVIDA IN THIRD TRIMESTER: ICD-10-CM

## 2018-05-23 DIAGNOSIS — Z15.89 MTHFR GENE MUTATION: Primary | ICD-10-CM

## 2018-05-23 PROCEDURE — 76805 OB US >/= 14 WKS SNGL FETUS: CPT | Performed by: OBSTETRICS & GYNECOLOGY

## 2018-05-23 PROCEDURE — 99213 OFFICE O/P EST LOW 20 MIN: CPT | Performed by: OBSTETRICS & GYNECOLOGY

## 2018-05-29 ENCOUNTER — ROUTINE PRENATAL (OUTPATIENT)
Dept: OBGYN CLINIC | Facility: CLINIC | Age: 38
End: 2018-05-29

## 2018-05-29 VITALS — WEIGHT: 152 LBS | DIASTOLIC BLOOD PRESSURE: 58 MMHG | SYSTOLIC BLOOD PRESSURE: 96 MMHG | BODY MASS INDEX: 26 KG/M2

## 2018-05-29 DIAGNOSIS — Z34.83 ENCOUNTER FOR SUPERVISION OF OTHER NORMAL PREGNANCY IN THIRD TRIMESTER: Primary | ICD-10-CM

## 2018-05-29 PROCEDURE — 81002 URINALYSIS NONAUTO W/O SCOPE: CPT | Performed by: OBSTETRICS & GYNECOLOGY

## 2018-05-30 ENCOUNTER — TELEPHONE (OUTPATIENT)
Dept: OBGYN CLINIC | Facility: CLINIC | Age: 38
End: 2018-05-30

## 2018-06-08 ENCOUNTER — TELEPHONE (OUTPATIENT)
Dept: OBGYN CLINIC | Facility: CLINIC | Age: 38
End: 2018-06-08

## 2018-06-08 NOTE — TELEPHONE ENCOUNTER
Per pt originally obtained Lovanox from reproductive endocrinologist for hx of   MTHFR and factor II deficiency. Was advised by doctor matthews to continue until delivery and to contact us the refills if necessary. Has 8 injections left.  Administeres onc

## 2018-06-08 NOTE — TELEPHONE ENCOUNTER
Talked to Valerie Folks from [employer] and Lovenox 90 day supply reordered. Pt called and informed Lovenox reordered. Pt voices understanding.

## 2018-06-08 NOTE — TELEPHONE ENCOUNTER
PER PT CALLING TO CHECK THE STATUS OF REFILL ON INJECTION / [facility] REQUESTED IT / PT WOULD LIKE A CALL BACK / PLS ADV

## 2018-06-13 ENCOUNTER — ROUTINE PRENATAL (OUTPATIENT)
Dept: OBGYN CLINIC | Facility: CLINIC | Age: 38
End: 2018-06-13

## 2018-06-13 VITALS — SYSTOLIC BLOOD PRESSURE: 114 MMHG | DIASTOLIC BLOOD PRESSURE: 62 MMHG | WEIGHT: 156.19 LBS | BODY MASS INDEX: 27 KG/M2

## 2018-06-13 DIAGNOSIS — Z23 NEED FOR VACCINATION: ICD-10-CM

## 2018-06-13 DIAGNOSIS — Z34.83 ENCOUNTER FOR SUPERVISION OF OTHER NORMAL PREGNANCY IN THIRD TRIMESTER: Primary | ICD-10-CM

## 2018-06-13 PROCEDURE — 81002 URINALYSIS NONAUTO W/O SCOPE: CPT | Performed by: OBSTETRICS & GYNECOLOGY

## 2018-06-13 NOTE — PROGRESS NOTES
C/o tailbone pain making her long commute to work very difficult/uncomfortable. Requested for note recommending work from home. Taking Lovenox and baby asa. NST weekly starts 34 wks. Growth u/s w MFM. Desires Tdap.

## 2018-06-18 ENCOUNTER — TELEPHONE (OUTPATIENT)
Dept: OBGYN CLINIC | Facility: CLINIC | Age: 38
End: 2018-06-18

## 2018-06-21 ENCOUNTER — HOSPITAL ENCOUNTER (OUTPATIENT)
Dept: PERINATAL CARE | Facility: HOSPITAL | Age: 38
Discharge: HOME | End: 2018-06-21
Attending: OBSTETRICS & GYNECOLOGY
Payer: COMMERCIAL

## 2018-06-21 VITALS — DIASTOLIC BLOOD PRESSURE: 65 MMHG | SYSTOLIC BLOOD PRESSURE: 98 MMHG | HEART RATE: 109 BPM

## 2018-06-21 DIAGNOSIS — Z15.89 MTHFR GENE MUTATION: Primary | ICD-10-CM

## 2018-06-21 DIAGNOSIS — O09.299 H/O POSTPARTUM HEMORRHAGE, CURRENTLY PREGNANT: ICD-10-CM

## 2018-06-21 DIAGNOSIS — O26.20 PREVIOUS RECURRENT MISCARRIAGES AFFECTING PREGNANCY, ANTEPARTUM: ICD-10-CM

## 2018-06-21 DIAGNOSIS — O09.523 ELDERLY MULTIGRAVIDA IN THIRD TRIMESTER: ICD-10-CM

## 2018-06-21 DIAGNOSIS — Z15.89 MTHFR GENE MUTATION: ICD-10-CM

## 2018-06-21 DIAGNOSIS — D68.2 FACTOR II DEFICIENCY (HCC): ICD-10-CM

## 2018-06-21 PROCEDURE — 76819 FETAL BIOPHYS PROFIL W/O NST: CPT | Performed by: OBSTETRICS & GYNECOLOGY

## 2018-06-21 PROCEDURE — 99215 OFFICE O/P EST HI 40 MIN: CPT | Performed by: OBSTETRICS & GYNECOLOGY

## 2018-06-21 PROCEDURE — 76805 OB US >/= 14 WKS SNGL FETUS: CPT | Performed by: OBSTETRICS & GYNECOLOGY

## 2018-06-21 NOTE — ADDENDUM NOTE
Encounter addended by: Ratna Nagy on: 6/21/2018 10:42 AM<BR>    Actions taken: Charge Capture section accepted

## 2018-06-25 ENCOUNTER — OFFICE VISIT (OUTPATIENT)
Dept: FAMILY MEDICINE CLINIC | Facility: CLINIC | Age: 38
End: 2018-06-25

## 2018-06-25 ENCOUNTER — NURSE TRIAGE (OUTPATIENT)
Dept: OTHER | Age: 38
End: 2018-06-25

## 2018-06-25 VITALS
DIASTOLIC BLOOD PRESSURE: 61 MMHG | HEART RATE: 97 BPM | TEMPERATURE: 98 F | RESPIRATION RATE: 16 BRPM | WEIGHT: 158 LBS | BODY MASS INDEX: 27 KG/M2 | SYSTOLIC BLOOD PRESSURE: 96 MMHG

## 2018-06-25 DIAGNOSIS — J01.00 ACUTE NON-RECURRENT MAXILLARY SINUSITIS: Primary | ICD-10-CM

## 2018-06-25 PROCEDURE — 99212 OFFICE O/P EST SF 10 MIN: CPT | Performed by: FAMILY MEDICINE

## 2018-06-25 PROCEDURE — 99213 OFFICE O/P EST LOW 20 MIN: CPT | Performed by: FAMILY MEDICINE

## 2018-06-25 RX ORDER — AMOXICILLIN 875 MG/1
875 TABLET, COATED ORAL 2 TIMES DAILY
Qty: 20 TABLET | Refills: 0 | Status: SHIPPED | OUTPATIENT
Start: 2018-06-25 | End: 2018-07-05

## 2018-06-25 NOTE — PROGRESS NOTES
HPI: Jenifer Phan is a 40year old female who presents for sinus congestion. It has been going on for over a week. She took Zyrtec and Benadryl which helped. Yesterday, she started having sinus pressure and pain. Having thick yellow drainage from nose.  Pt is cu maxillary sinus. Oral mucous membrane moist.  Normal lips, teeth, and gums. Oropharynx normal.  Neck supple. Good ROM. No LAD.     CV:  Regular rate and rhythm; no murmurs  Lungs:  Clear to ausculation; good aeration               No wheezes, rales or

## 2018-06-25 NOTE — TELEPHONE ENCOUNTER
Action Requested: Summary for Provider     []  Critical Lab, Recommendations Needed  [] Need Additional Advice  []   FYI    []   Need Orders  [] Need Medications Sent to Pharmacy  []  Other     SUMMARY: pt reports significant discomfort with nasal allergie

## 2018-06-26 ENCOUNTER — HOSPITAL ENCOUNTER (OUTPATIENT)
Dept: PERINATAL CARE | Facility: HOSPITAL | Age: 38
Discharge: HOME | End: 2018-06-26
Attending: OBSTETRICS & GYNECOLOGY
Payer: COMMERCIAL

## 2018-06-26 ENCOUNTER — ROUTINE PRENATAL (OUTPATIENT)
Dept: OBGYN CLINIC | Facility: CLINIC | Age: 38
End: 2018-06-26

## 2018-06-26 VITALS — BODY MASS INDEX: 27 KG/M2 | SYSTOLIC BLOOD PRESSURE: 104 MMHG | WEIGHT: 156.19 LBS | DIASTOLIC BLOOD PRESSURE: 62 MMHG

## 2018-06-26 DIAGNOSIS — O09.529 AMA (ADVANCED MATERNAL AGE) MULTIGRAVIDA 35+: Primary | ICD-10-CM

## 2018-06-26 DIAGNOSIS — Z34.83 ENCOUNTER FOR SUPERVISION OF OTHER NORMAL PREGNANCY IN THIRD TRIMESTER: Primary | ICD-10-CM

## 2018-06-26 DIAGNOSIS — O09.523 ELDERLY MULTIGRAVIDA IN THIRD TRIMESTER: ICD-10-CM

## 2018-06-26 PROCEDURE — 59025 FETAL NON-STRESS TEST: CPT | Performed by: OBSTETRICS & GYNECOLOGY

## 2018-06-26 PROCEDURE — 81002 URINALYSIS NONAUTO W/O SCOPE: CPT | Performed by: OBSTETRICS & GYNECOLOGY

## 2018-06-26 NOTE — NST
Nonstress Test   Patient: Pranav Sampson    Gestation: 34w3d    NST: ama       Variability: Moderate           Accelerations: Yes           Decelerations: None            Baseline: 130 BPM           Uterine Irritability: No           Contractions: Not present

## 2018-06-26 NOTE — ADDENDUM NOTE
Encounter addended by: Monique Dailey RN on: 6/26/2018  3:10 PM<BR>    Actions taken: Visit Navigator Flowsheet section accepted

## 2018-06-26 NOTE — PROGRESS NOTES
Starting weekly NSTs today. Has sinus infection and would like TDap vaccine next week when feeling better. To switch to Heparin at 36 weeks. To do labs by next visit.

## 2018-06-29 NOTE — ADDENDUM NOTE
Encounter addended by: Johanna Simeon MD on: 6/28/2018 11:06 PM<BR>    Actions taken: Problem List modified, Visit diagnoses modified, Charge Capture section accepted, Sign clinical note

## 2018-07-02 ENCOUNTER — TELEPHONE (OUTPATIENT)
Dept: OBGYN CLINIC | Facility: CLINIC | Age: 38
End: 2018-07-02

## 2018-07-02 NOTE — TELEPHONE ENCOUNTER
Pt placed on Amoxicillin for a sinus infection on 06/25. Pt wanting to know if she can take a probiotic due to the stomach irritation the amoxicillin is causing. Please advise.

## 2018-07-02 NOTE — TELEPHONE ENCOUNTER
PER PT  HAS A QUESTION / HE WANT TO KNOW IF PT CAN TAKE PROBIOTIC / BECAUSE THE MEDICATION  ANTIBIOTIC  UPSET HER STOMACH / PT 34 WEEKS / PLS ADV

## 2018-07-03 ENCOUNTER — LAB ENCOUNTER (OUTPATIENT)
Dept: LAB | Facility: HOSPITAL | Age: 38
End: 2018-07-03
Attending: OBSTETRICS & GYNECOLOGY
Payer: COMMERCIAL

## 2018-07-03 ENCOUNTER — HOSPITAL ENCOUNTER (OUTPATIENT)
Dept: PERINATAL CARE | Facility: HOSPITAL | Age: 38
Discharge: HOME | End: 2018-07-03
Attending: OBSTETRICS & GYNECOLOGY
Payer: COMMERCIAL

## 2018-07-03 VITALS — SYSTOLIC BLOOD PRESSURE: 90 MMHG | DIASTOLIC BLOOD PRESSURE: 56 MMHG

## 2018-07-03 DIAGNOSIS — O09.529 AMA (ADVANCED MATERNAL AGE) MULTIGRAVIDA 35+: ICD-10-CM

## 2018-07-03 DIAGNOSIS — D68.2 FACTOR II DEFICIENCY (HCC): ICD-10-CM

## 2018-07-03 DIAGNOSIS — O09.523 ELDERLY MULTIGRAVIDA IN THIRD TRIMESTER: ICD-10-CM

## 2018-07-03 DIAGNOSIS — Z34.83 ENCOUNTER FOR SUPERVISION OF OTHER NORMAL PREGNANCY IN THIRD TRIMESTER: ICD-10-CM

## 2018-07-03 DIAGNOSIS — Z15.89 MTHFR GENE MUTATION: Primary | ICD-10-CM

## 2018-07-03 LAB
BASOPHILS # BLD: 0 K/UL (ref 0–0.2)
BASOPHILS NFR BLD: 0 %
EOSINOPHIL # BLD: 0.1 K/UL (ref 0–0.7)
EOSINOPHIL NFR BLD: 1 %
ERYTHROCYTE [DISTWIDTH] IN BLOOD BY AUTOMATED COUNT: 12.9 % (ref 11–15)
HCT VFR BLD AUTO: 33.5 % (ref 35–48)
HGB BLD-MCNC: 11.8 G/DL (ref 12–16)
HIV1+2 AB SERPL QL IA: NONREACTIVE
LYMPHOCYTES # BLD: 2 K/UL (ref 1–4)
LYMPHOCYTES NFR BLD: 19 %
MCH RBC QN AUTO: 33.5 PG (ref 27–32)
MCHC RBC AUTO-ENTMCNC: 35.2 G/DL (ref 32–37)
MCV RBC AUTO: 95.3 FL (ref 80–100)
MONOCYTES # BLD: 0.5 K/UL (ref 0–1)
MONOCYTES NFR BLD: 5 %
NEUTROPHILS # BLD AUTO: 8.1 K/UL (ref 1.8–7.7)
NEUTROPHILS NFR BLD: 75 %
PLATELET # BLD AUTO: 278 K/UL (ref 140–400)
PMV BLD AUTO: 9.3 FL (ref 7.4–10.3)
RBC # BLD AUTO: 3.51 M/UL (ref 3.7–5.4)
T PALLIDUM AB SER QL: NEGATIVE
WBC # BLD AUTO: 10.8 K/UL (ref 4–11)

## 2018-07-03 PROCEDURE — 87389 HIV-1 AG W/HIV-1&-2 AB AG IA: CPT

## 2018-07-03 PROCEDURE — 85025 COMPLETE CBC W/AUTO DIFF WBC: CPT

## 2018-07-03 PROCEDURE — 36415 COLL VENOUS BLD VENIPUNCTURE: CPT

## 2018-07-03 PROCEDURE — 59025 FETAL NON-STRESS TEST: CPT | Performed by: OBSTETRICS & GYNECOLOGY

## 2018-07-03 PROCEDURE — 86780 TREPONEMA PALLIDUM: CPT

## 2018-07-07 ENCOUNTER — TELEPHONE (OUTPATIENT)
Dept: PEDIATRICS CLINIC | Facility: CLINIC | Age: 38
End: 2018-07-07

## 2018-07-10 ENCOUNTER — ROUTINE PRENATAL (OUTPATIENT)
Dept: OBGYN CLINIC | Facility: CLINIC | Age: 38
End: 2018-07-10

## 2018-07-10 ENCOUNTER — HOSPITAL ENCOUNTER (OUTPATIENT)
Dept: PERINATAL CARE | Facility: HOSPITAL | Age: 38
Discharge: HOME | End: 2018-07-10
Attending: OBSTETRICS & GYNECOLOGY
Payer: COMMERCIAL

## 2018-07-10 VITALS — BODY MASS INDEX: 27 KG/M2 | WEIGHT: 158.81 LBS | DIASTOLIC BLOOD PRESSURE: 62 MMHG | SYSTOLIC BLOOD PRESSURE: 104 MMHG

## 2018-07-10 VITALS — SYSTOLIC BLOOD PRESSURE: 97 MMHG | DIASTOLIC BLOOD PRESSURE: 58 MMHG

## 2018-07-10 DIAGNOSIS — O09.529 AMA (ADVANCED MATERNAL AGE) MULTIGRAVIDA 35+: ICD-10-CM

## 2018-07-10 DIAGNOSIS — Z34.83 ENCOUNTER FOR SUPERVISION OF OTHER NORMAL PREGNANCY IN THIRD TRIMESTER: Primary | ICD-10-CM

## 2018-07-10 DIAGNOSIS — Z15.89 MTHFR GENE MUTATION: Primary | ICD-10-CM

## 2018-07-10 LAB
APPEARANCE: CLEAR
MULTISTIX LOT#: NORMAL NUMERIC
PH, URINE: 6.5 (ref 4.5–8)
SPECIFIC GRAVITY: 1.01 (ref 1–1.03)
URINE-COLOR: YELLOW
UROBILINOGEN,SEMI-QN: 0.2 MG/DL (ref 0–1.9)

## 2018-07-10 PROCEDURE — 81002 URINALYSIS NONAUTO W/O SCOPE: CPT | Performed by: OBSTETRICS & GYNECOLOGY

## 2018-07-10 PROCEDURE — 90715 TDAP VACCINE 7 YRS/> IM: CPT | Performed by: OBSTETRICS & GYNECOLOGY

## 2018-07-10 PROCEDURE — 90471 IMMUNIZATION ADMIN: CPT | Performed by: OBSTETRICS & GYNECOLOGY

## 2018-07-10 PROCEDURE — 59025 FETAL NON-STRESS TEST: CPT

## 2018-07-10 RX ORDER — AMOXICILLIN 875 MG/1
TABLET, COATED ORAL
COMMUNITY
Start: 2018-07-01 | End: 2018-07-24

## 2018-07-13 ENCOUNTER — TELEPHONE (OUTPATIENT)
Dept: OBGYN CLINIC | Facility: CLINIC | Age: 38
End: 2018-07-13

## 2018-07-13 NOTE — TELEPHONE ENCOUNTER
Talked to from pt's pharmacy. Just needed to clarify that Enoxaparin is the generic brand for lovenox, and that pt can use Lovenox.

## 2018-07-16 NOTE — TELEPHONE ENCOUNTER
Pharm needs clarification on Enoxaparin med before shipping out med as the pt. Just got a refill for Heprin.

## 2018-07-17 ENCOUNTER — TELEPHONE (OUTPATIENT)
Dept: OBGYN CLINIC | Facility: CLINIC | Age: 38
End: 2018-07-17

## 2018-07-17 ENCOUNTER — TELEPHONE (OUTPATIENT)
Dept: PEDIATRICS CLINIC | Facility: CLINIC | Age: 38
End: 2018-07-17

## 2018-07-17 ENCOUNTER — HOSPITAL ENCOUNTER (OUTPATIENT)
Dept: PERINATAL CARE | Facility: HOSPITAL | Age: 38
Discharge: HOME | End: 2018-07-17
Attending: OBSTETRICS & GYNECOLOGY
Payer: COMMERCIAL

## 2018-07-17 VITALS — SYSTOLIC BLOOD PRESSURE: 120 MMHG | DIASTOLIC BLOOD PRESSURE: 68 MMHG

## 2018-07-17 DIAGNOSIS — D68.2 FACTOR II DEFICIENCY (HCC): Primary | ICD-10-CM

## 2018-07-17 DIAGNOSIS — Z15.89 MTHFR GENE MUTATION: ICD-10-CM

## 2018-07-17 DIAGNOSIS — O09.521 ELDERLY MULTIGRAVIDA IN FIRST TRIMESTER: ICD-10-CM

## 2018-07-17 DIAGNOSIS — O09.529 AMA (ADVANCED MATERNAL AGE) MULTIGRAVIDA 35+: ICD-10-CM

## 2018-07-17 PROCEDURE — 59025 FETAL NON-STRESS TEST: CPT | Performed by: OBSTETRICS & GYNECOLOGY

## 2018-07-17 NOTE — TELEPHONE ENCOUNTER
[facility] needs clarification on pt's medications. -    1. Clarify that pt is supposed to be taking only Heparin and not Enoxaparin together. 2. Will pt continue to take Enoxaparin after she delivers, or should medication be d/c'd?     Pl

## 2018-07-17 NOTE — TELEPHONE ENCOUNTER
Per pt she is not able to make it to the 11am appt with Milvia Story, per pt she has questions regarding her medications

## 2018-07-17 NOTE — NST
Nonstress Test   Patient: Dillon Dang    Gestation: 17E0N    NST:ama       Variability: Moderate           Accelerations: Yes           Decelerations: None            Baseline: 130 BPM           Uterine Irritability: No           Contractions: Irregular

## 2018-07-17 NOTE — TELEPHONE ENCOUNTER
Talked to representative from [address] [place] [address] [phone]. Informed that pt will be taking Heparin only until she delivers and then will be switched to Lovenox.

## 2018-07-17 NOTE — TELEPHONE ENCOUNTER
Patient should only be taking Heparin now until delivery. 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 She is never to take the two together.

## 2018-07-18 ENCOUNTER — ROUTINE PRENATAL (OUTPATIENT)
Dept: OBGYN CLINIC | Facility: CLINIC | Age: 38
End: 2018-07-18

## 2018-07-18 VITALS
DIASTOLIC BLOOD PRESSURE: 65 MMHG | WEIGHT: 157 LBS | SYSTOLIC BLOOD PRESSURE: 100 MMHG | BODY MASS INDEX: 27 KG/M2 | HEART RATE: 96 BPM

## 2018-07-18 DIAGNOSIS — Z34.83 ENCOUNTER FOR SUPERVISION OF OTHER NORMAL PREGNANCY IN THIRD TRIMESTER: Primary | ICD-10-CM

## 2018-07-18 PROBLEM — Z34.90 SUPERVISION OF NORMAL PREGNANCY: Status: ACTIVE | Noted: 2018-07-18

## 2018-07-18 PROBLEM — Z34.90 SUPERVISION OF NORMAL PREGNANCY (HCC): Status: ACTIVE | Noted: 2018-07-18

## 2018-07-18 LAB
APPEARANCE: CLEAR
BILIRUBIN: NEGATIVE
GLUCOSE (URINE DIPSTICK): NEGATIVE MG/DL
KETONES (URINE DIPSTICK): NEGATIVE MG/DL
MULTISTIX LOT#: NORMAL NUMERIC
NITRITE, URINE: NEGATIVE
OCCULT BLOOD: NEGATIVE
PH, URINE: 7.5 (ref 4.5–8)
PROTEIN (URINE DIPSTICK): NEGATIVE MG/DL
SPECIFIC GRAVITY: 1.01 (ref 1–1.03)
URINE-COLOR: YELLOW
UROBILINOGEN,SEMI-QN: 0.2 MG/DL (ref 0–1.9)

## 2018-07-18 PROCEDURE — 81003 URINALYSIS AUTO W/O SCOPE: CPT | Performed by: OBSTETRICS & GYNECOLOGY

## 2018-07-18 NOTE — PROGRESS NOTES
No complaints. Switched from Lovenox to heparin 5000 units subcu every 12 hours. Stopping baby aspirin  Labor and rupture membrane precautions  Counseled extensively.

## 2018-07-24 ENCOUNTER — TELEPHONE (OUTPATIENT)
Dept: OBGYN CLINIC | Facility: CLINIC | Age: 38
End: 2018-07-24

## 2018-07-24 ENCOUNTER — ROUTINE PRENATAL (OUTPATIENT)
Dept: OBGYN CLINIC | Facility: CLINIC | Age: 38
End: 2018-07-24
Payer: COMMERCIAL

## 2018-07-24 ENCOUNTER — HOSPITAL ENCOUNTER (OUTPATIENT)
Dept: PERINATAL CARE | Facility: HOSPITAL | Age: 38
Discharge: HOME | End: 2018-07-24
Attending: OBSTETRICS & GYNECOLOGY
Payer: COMMERCIAL

## 2018-07-24 VITALS — BODY MASS INDEX: 27 KG/M2 | WEIGHT: 156.81 LBS

## 2018-07-24 VITALS — SYSTOLIC BLOOD PRESSURE: 98 MMHG | DIASTOLIC BLOOD PRESSURE: 60 MMHG

## 2018-07-24 DIAGNOSIS — O09.523 ELDERLY MULTIGRAVIDA IN THIRD TRIMESTER: Primary | ICD-10-CM

## 2018-07-24 DIAGNOSIS — Z34.83 ENCOUNTER FOR SUPERVISION OF OTHER NORMAL PREGNANCY IN THIRD TRIMESTER: Primary | ICD-10-CM

## 2018-07-24 LAB
APPEARANCE: CLEAR
MULTISTIX LOT#: NORMAL NUMERIC
PH, URINE: 7.5 (ref 4.5–8)
SPECIFIC GRAVITY: 1.01 (ref 1–1.03)
URINE-COLOR: YELLOW
UROBILINOGEN,SEMI-QN: 0.2 MG/DL (ref 0–1.9)

## 2018-07-24 PROCEDURE — 81002 URINALYSIS NONAUTO W/O SCOPE: CPT | Performed by: OBSTETRICS & GYNECOLOGY

## 2018-07-24 PROCEDURE — 59025 FETAL NON-STRESS TEST: CPT

## 2018-07-24 RX ORDER — HEPARIN SODIUM 5000 [USP'U]/ML
5000 INJECTION, SOLUTION INTRAVENOUS; SUBCUTANEOUS 2 TIMES DAILY
Status: ON HOLD | COMMUNITY
End: 2018-08-02

## 2018-07-24 NOTE — TELEPHONE ENCOUNTER
Talked to Carey Aranda from [address] [facility] and advised him even though pt is on Heparin, she would like to have her prescription for her Lovenox mailed to her so that  after she has her baby she will have it on hand.  Carey Aranda voices a shipment of her Lovenox will ship

## 2018-07-24 NOTE — PROGRESS NOTES
IOL Monday night with Cervidil and Tuesday morning with Pitocin. To stop Heparin Monday night. Labor Precautions reviewed.

## 2018-07-24 NOTE — TELEPHONE ENCOUNTER
Patient dropped off fmla forms to be completed. Pt states she will pay $25 fee once completed. Pt being induced Monday. Pt does not want to  forms, just faxed to # on forms and mailed to her home.

## 2018-07-24 NOTE — NST
Nonstress Test   Patient: Simeon Solis    Gestation: 38w3d    NST:       Variability: Moderate           Accelerations: Yes           Decelerations: None            Baseline: 135 BPM           Uterine Irritability: Yes           Contractions: Not present

## 2018-07-24 NOTE — TELEPHONE ENCOUNTER
Patient was asking if she can get her Lovenox medication before delivery just have it ready, but states she needs clarification from  To call pharmacy that it's ok to send medication before delivery.  Per Dr. Yane Fernandez it's ok to give her medication befor

## 2018-07-25 NOTE — TELEPHONE ENCOUNTER
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 Logged for processing.  NK

## 2018-07-26 ENCOUNTER — TELEPHONE (OUTPATIENT)
Dept: OBGYN CLINIC | Facility: CLINIC | Age: 38
End: 2018-07-26

## 2018-07-26 NOTE — TELEPHONE ENCOUNTER
Spoke with Demarco Joshi and advised him per [employer], usually they clamp the cord at 1 min. Edmund verbalized understanding.

## 2018-07-26 NOTE — TELEPHONE ENCOUNTER
Dr. Jack Taylor    Please sign off on form:  -Highlight the patient and hit \"Chart\" button. -In Chart Review, w/in the Encounter tab - click 1 time on the Telephone call encounter for 7-24-18.  Scroll down the telephone encounter.  -Click \"scan on\" blue H

## 2018-07-30 ENCOUNTER — HOSPITAL ENCOUNTER (INPATIENT)
Dept: OBGYN CLINIC | Facility: HOSPITAL | Age: 38
Discharge: HOME | End: 2018-07-30
Payer: COMMERCIAL

## 2018-07-30 ENCOUNTER — HOSPITAL ENCOUNTER (INPATIENT)
Facility: HOSPITAL | Age: 38
LOS: 3 days | Discharge: HOME | End: 2018-08-02
Attending: OBSTETRICS & GYNECOLOGY | Admitting: OBSTETRICS & GYNECOLOGY
Payer: COMMERCIAL

## 2018-07-30 PROBLEM — O26.22 PREGNANCY CARE OF HABITUAL ABORTER IN SECOND TRIMESTER: Status: ACTIVE | Noted: 2018-07-30

## 2018-07-30 PROBLEM — O26.22: Status: ACTIVE | Noted: 2018-07-30

## 2018-07-30 LAB
ANTIBODY SCREEN: NEGATIVE
ERYTHROCYTE [DISTWIDTH] IN BLOOD BY AUTOMATED COUNT: 12.9 % (ref 11–15)
HCT VFR BLD AUTO: 36.9 % (ref 35–48)
HGB BLD-MCNC: 12.9 G/DL (ref 12–16)
MCH RBC QN AUTO: 33.1 PG (ref 27–32)
MCHC RBC AUTO-ENTMCNC: 34.9 G/DL (ref 32–37)
MCV RBC AUTO: 95 FL (ref 80–100)
PLATELET # BLD AUTO: 193 K/UL (ref 140–400)
PMV BLD AUTO: 10.9 FL (ref 7.4–10.3)
RBC # BLD AUTO: 3.89 M/UL (ref 3.7–5.4)
RH BLOOD TYPE: POSITIVE
WBC # BLD AUTO: 10.4 K/UL (ref 4–11)

## 2018-07-30 RX ORDER — DEXTROSE, SODIUM CHLORIDE, SODIUM LACTATE, POTASSIUM CHLORIDE, AND CALCIUM CHLORIDE 5; .6; .31; .03; .02 G/100ML; G/100ML; G/100ML; G/100ML; G/100ML
125 INJECTION, SOLUTION INTRAVENOUS CONTINUOUS
Status: DISCONTINUED | OUTPATIENT
Start: 2018-07-30 | End: 2018-07-31 | Stop reason: HOSPADM

## 2018-07-30 RX ORDER — TERBUTALINE SULFATE 1 MG/ML
0.25 INJECTION, SOLUTION SUBCUTANEOUS AS NEEDED
Status: DISCONTINUED | OUTPATIENT
Start: 2018-07-30 | End: 2018-07-31 | Stop reason: HOSPADM

## 2018-07-30 RX ORDER — DEXTROSE, SODIUM CHLORIDE, SODIUM LACTATE, POTASSIUM CHLORIDE, AND CALCIUM CHLORIDE 5; .6; .31; .03; .02 G/100ML; G/100ML; G/100ML; G/100ML; G/100ML
INJECTION, SOLUTION INTRAVENOUS
Status: DISPENSED
Start: 2018-07-30 | End: 2018-07-31

## 2018-07-30 RX ORDER — AMMONIA INHALANTS 0.04 G/.3ML
0.3 INHALANT RESPIRATORY (INHALATION) AS NEEDED
Status: DISCONTINUED | OUTPATIENT
Start: 2018-07-30 | End: 2018-07-31 | Stop reason: HOSPADM

## 2018-07-30 RX ORDER — METOCLOPRAMIDE HYDROCHLORIDE 5 MG/ML
INJECTION INTRAMUSCULAR; INTRAVENOUS
Status: DISCONTINUED
Start: 2018-07-30 | End: 2018-07-31 | Stop reason: WASHOUT

## 2018-07-30 RX ORDER — NALBUPHINE HCL 10 MG/ML
10 AMPUL (ML) INJECTION
Status: DISCONTINUED | OUTPATIENT
Start: 2018-07-30 | End: 2018-07-31

## 2018-07-30 RX ORDER — IBUPROFEN 600 MG/1
600 TABLET ORAL ONCE AS NEEDED
Status: DISCONTINUED | OUTPATIENT
Start: 2018-07-30 | End: 2018-07-31 | Stop reason: HOSPADM

## 2018-07-30 RX ORDER — MISOPROSTOL 200 UG/1
TABLET ORAL
Status: DISCONTINUED
Start: 2018-07-30 | End: 2018-07-31 | Stop reason: WASHOUT

## 2018-07-30 RX ORDER — METHYLERGONOVINE MALEATE 0.2 MG/ML
INJECTION INTRAVENOUS
Status: DISCONTINUED
Start: 2018-07-30 | End: 2018-07-31 | Stop reason: WASHOUT

## 2018-07-30 RX ORDER — SODIUM CHLORIDE 0.9 % (FLUSH) 0.9 %
10 SYRINGE (ML) INJECTION AS NEEDED
Status: DISCONTINUED | OUTPATIENT
Start: 2018-07-30 | End: 2018-07-31 | Stop reason: HOSPADM

## 2018-07-30 RX ORDER — CARBOPROST TROMETHAMINE 250 UG/ML
INJECTION, SOLUTION INTRAMUSCULAR
Status: DISCONTINUED
Start: 2018-07-30 | End: 2018-07-31 | Stop reason: WASHOUT

## 2018-07-30 RX ORDER — FAMOTIDINE 10 MG/ML
INJECTION, SOLUTION INTRAVENOUS
Status: DISCONTINUED
Start: 2018-07-30 | End: 2018-07-31 | Stop reason: WASHOUT

## 2018-07-30 RX ORDER — LIDOCAINE HYDROCHLORIDE 10 MG/ML
30 INJECTION, SOLUTION EPIDURAL; INFILTRATION; INTRACAUDAL; PERINEURAL ONCE
Status: DISCONTINUED | OUTPATIENT
Start: 2018-07-30 | End: 2018-07-31 | Stop reason: HOSPADM

## 2018-07-30 RX ORDER — HYDROXYZINE HYDROCHLORIDE 50 MG/ML
50 INJECTION, SOLUTION INTRAMUSCULAR EVERY 4 HOURS PRN
Status: DISCONTINUED | OUTPATIENT
Start: 2018-07-30 | End: 2018-07-31

## 2018-07-30 RX ORDER — TRISODIUM CITRATE DIHYDRATE AND CITRIC ACID MONOHYDRATE 500; 334 MG/5ML; MG/5ML
30 SOLUTION ORAL AS NEEDED
Status: DISCONTINUED | OUTPATIENT
Start: 2018-07-30 | End: 2018-07-31 | Stop reason: HOSPADM

## 2018-07-30 RX ORDER — CEFAZOLIN SODIUM/WATER 2 G/20 ML
SYRINGE (ML) INTRAVENOUS
Status: DISCONTINUED
Start: 2018-07-30 | End: 2018-07-31 | Stop reason: WASHOUT

## 2018-07-31 PROCEDURE — 3E0P7VZ INTRODUCTION OF HORMONE INTO FEMALE REPRODUCTIVE, VIA NATURAL OR ARTIFICIAL OPENING: ICD-10-PCS | Performed by: OBSTETRICS & GYNECOLOGY

## 2018-07-31 PROCEDURE — 59400 OBSTETRICAL CARE: CPT | Performed by: OBSTETRICS & GYNECOLOGY

## 2018-07-31 PROCEDURE — 0KQM0ZZ REPAIR PERINEUM MUSCLE, OPEN APPROACH: ICD-10-PCS | Performed by: OBSTETRICS & GYNECOLOGY

## 2018-07-31 RX ORDER — CARBOPROST TROMETHAMINE 250 UG/ML
INJECTION, SOLUTION INTRAMUSCULAR
Status: DISCONTINUED
Start: 2018-07-31 | End: 2018-07-31 | Stop reason: WASHOUT

## 2018-07-31 RX ORDER — SIMETHICONE 80 MG
80 TABLET,CHEWABLE ORAL 3 TIMES DAILY PRN
Status: DISCONTINUED | OUTPATIENT
Start: 2018-07-31 | End: 2018-08-02

## 2018-07-31 RX ORDER — DOCUSATE SODIUM 100 MG/1
100 CAPSULE, LIQUID FILLED ORAL 2 TIMES DAILY
Status: DISCONTINUED | OUTPATIENT
Start: 2018-07-31 | End: 2018-08-02

## 2018-07-31 RX ORDER — PRENATAL VIT,CAL 76/IRON/FOLIC 29 MG-1 MG
1 TABLET ORAL DAILY
Status: DISCONTINUED | OUTPATIENT
Start: 2018-07-31 | End: 2018-08-02

## 2018-07-31 RX ORDER — MISOPROSTOL 200 UG/1
TABLET ORAL
Status: DISCONTINUED
Start: 2018-07-31 | End: 2018-07-31 | Stop reason: WASHOUT

## 2018-07-31 RX ORDER — ACETAMINOPHEN 325 MG/1
650 TABLET ORAL EVERY 6 HOURS PRN
Status: DISCONTINUED | OUTPATIENT
Start: 2018-07-31 | End: 2018-08-02

## 2018-07-31 RX ORDER — ONDANSETRON 2 MG/ML
4 INJECTION INTRAMUSCULAR; INTRAVENOUS EVERY 6 HOURS PRN
Status: DISCONTINUED | OUTPATIENT
Start: 2018-07-31 | End: 2018-08-02

## 2018-07-31 RX ORDER — AMMONIA INHALANTS 0.04 G/.3ML
0.3 INHALANT RESPIRATORY (INHALATION) AS NEEDED
Status: DISCONTINUED | OUTPATIENT
Start: 2018-07-31 | End: 2018-08-02

## 2018-07-31 RX ORDER — ENOXAPARIN SODIUM 100 MG/ML
40 INJECTION SUBCUTANEOUS DAILY
Status: DISCONTINUED | OUTPATIENT
Start: 2018-07-31 | End: 2018-08-02

## 2018-07-31 RX ORDER — DIAPER,BRIEF,INFANT-TODD,DISP
1 EACH MISCELLANEOUS EVERY 6 HOURS PRN
Status: DISCONTINUED | OUTPATIENT
Start: 2018-07-31 | End: 2018-08-02

## 2018-07-31 RX ORDER — BISACODYL 10 MG
10 SUPPOSITORY, RECTAL RECTAL ONCE AS NEEDED
Status: DISCONTINUED | OUTPATIENT
Start: 2018-07-31 | End: 2018-08-02

## 2018-07-31 RX ORDER — LIDOCAINE HYDROCHLORIDE AND EPINEPHRINE 20; 5 MG/ML; UG/ML
INJECTION, SOLUTION EPIDURAL; INFILTRATION; INTRACAUDAL; PERINEURAL
Status: DISCONTINUED
Start: 2018-07-31 | End: 2018-07-31 | Stop reason: WASHOUT

## 2018-07-31 RX ORDER — ENOXAPARIN SODIUM 100 MG/ML
40 INJECTION SUBCUTANEOUS DAILY
Status: DISCONTINUED | OUTPATIENT
Start: 2018-07-31 | End: 2018-07-31 | Stop reason: DRUGHIGH

## 2018-07-31 RX ORDER — SODIUM CHLORIDE, SODIUM LACTATE, POTASSIUM CHLORIDE, CALCIUM CHLORIDE 600; 310; 30; 20 MG/100ML; MG/100ML; MG/100ML; MG/100ML
INJECTION, SOLUTION INTRAVENOUS
Status: DISPENSED
Start: 2018-07-31 | End: 2018-07-31

## 2018-07-31 RX ORDER — SODIUM CHLORIDE 0.9 % (FLUSH) 0.9 %
10 SYRINGE (ML) INJECTION AS NEEDED
Status: DISCONTINUED | OUTPATIENT
Start: 2018-07-31 | End: 2018-08-02

## 2018-07-31 RX ORDER — METHYLERGONOVINE MALEATE 0.2 MG/ML
INJECTION INTRAVENOUS
Status: DISCONTINUED
Start: 2018-07-31 | End: 2018-07-31 | Stop reason: WASHOUT

## 2018-07-31 RX ORDER — LIDOCAINE HYDROCHLORIDE 10 MG/ML
INJECTION, SOLUTION EPIDURAL; INFILTRATION; INTRACAUDAL; PERINEURAL
Status: DISPENSED
Start: 2018-07-31 | End: 2018-07-31

## 2018-07-31 NOTE — LACTATION NOTE
LACTATION NOTE - MOTHER      Evaluation Type: Inpatient    Problems identified  Problems identified: Knowledge deficit; Return to work issues    Maternal history  Maternal history: AMA  Other/comment: hx PPH, MTHFR mutation, habitual aborter, Factor II defi

## 2018-07-31 NOTE — PLAN OF CARE
POSTPARTUM    • Long Term Goal:Experiences normal postpartum course Progressing          Sat with patient and discussed plan of care

## 2018-07-31 NOTE — L&D DELIVERY NOTE
Kingnikki Reynolds, Girl  [[id number]]    Labor Events    Cervical ripening type:  Cervidil  Antibiotics received during labor?:  No  Rupture date/time:  7/31/2018 0100       Induction:  Cervidil  Indications for induction:  Other - comment     Labor Event Times    Labo Minute:   20 Minute:     Skin color:   Heart rate:   Reflex irritability:   Muscle tone:   Respiratory effort:    Total:            1    2    2    2    2    9             1    2    2    2    2    9                                          Apgars assigned by

## 2018-07-31 NOTE — H&P
[facility] Patient Status:  Inpatient    1980 MRN [id number]   Location [address] [place] [address] Attending Joseph Lopez MD   Hosp Day # 1 PCP Alex Wright DO     Date of Adm hyperlipidemia   • Thyroid disease Mother    • Lipids Other      hyperlipidemia, family h/o   • Diabetes Other      family h/o   • Breast Cancer Maternal Grandmother      Social History:    Smoking status: Never Smoker    Smokeless tobacco: Never Used    A @BRIEFLAB(TREPONEMALAB,RAPIDHIVSCRN,HBVSAG,ABO,RH,WBC,HGB,HCT,PLT,CREATSERUM,BUN,NA,K,CL,CO2,GLU,CA,ALB,ALKPHO,BILT,TP,AST,ALT,PTT,INR,PT,T4F,TSH,TSHREFLEX,STEWART,LIP,GGT,DDIMER,ESRML,ESRPF,CRP,BNP,MG,PHOS,TROP,CK,CKMB,RAJWINDER,RPR,RF,B12,ETOH,COLORUR,CLARITY,SP

## 2018-08-01 LAB
BASOPHILS # BLD: 0.1 K/UL (ref 0–0.2)
BASOPHILS NFR BLD: 1 %
EOSINOPHIL # BLD: 0.2 K/UL (ref 0–0.7)
EOSINOPHIL NFR BLD: 2 %
ERYTHROCYTE [DISTWIDTH] IN BLOOD BY AUTOMATED COUNT: 13.1 % (ref 11–15)
HCT VFR BLD AUTO: 36.8 % (ref 35–48)
HGB BLD-MCNC: 13 G/DL (ref 12–16)
LYMPHOCYTES # BLD: 3.8 K/UL (ref 1–4)
LYMPHOCYTES NFR BLD: 32 %
MCH RBC QN AUTO: 33.5 PG (ref 27–32)
MCHC RBC AUTO-ENTMCNC: 35.2 G/DL (ref 32–37)
MCV RBC AUTO: 95.3 FL (ref 80–100)
MONOCYTES # BLD: 0.6 K/UL (ref 0–1)
MONOCYTES NFR BLD: 5 %
NEUTROPHILS # BLD AUTO: 7.4 K/UL (ref 1.8–7.7)
NEUTROPHILS NFR BLD: 61 %
PLATELET # BLD AUTO: 184 K/UL (ref 140–400)
PMV BLD AUTO: 9.7 FL (ref 7.4–10.3)
RBC # BLD AUTO: 3.86 M/UL (ref 3.7–5.4)
WBC # BLD AUTO: 12.1 K/UL (ref 4–11)

## 2018-08-01 NOTE — LACTATION NOTE
LACTATION NOTE - MOTHER      Evaluation Type: Inpatient    Problems identified  Problems identified: Knowledge deficit;Milk supply WNL    Maternal history  Maternal history: AMA  Other/comment: hx PPH, MTHFR mutation, habitual aborter, Factor II deficiency

## 2018-08-01 NOTE — PLAN OF CARE
Sat with patient to review plan of care. Discussed analgesic options and answered all questions. VSS. Breastfeeding on demand. Breastfeeding successfully. Voiding independently. Lochia is WNL. Uterus is firm.

## 2018-08-01 NOTE — PROGRESS NOTES
[facility]    OB/GYNE Progress Note      Simeon Solis Patient Status:  Inpatient    1980 MRN [id number]   Location [facility] 3SE Attending Sun Pearson MD   [facility] Day # 2 PCP Ian Ortega,        Assessment/Plan BILT 0.6 05/26/2017   TP 7.5 05/26/2017   AST 24 05/26/2017   ALT 25 05/26/2017   PTT 29.4 05/26/2017   T4F 0.84 05/26/2017   TSH 1.98 01/31/2018         Lab Results  Component Value Date   RPR Non-Reactive 05/24/2012   COLORUR Yellow 01/31/2018   CLARIT

## 2018-08-01 NOTE — LACTATION NOTE
This note was copied from a baby's chart.   LACTATION NOTE - INFANT    Evaluation Type  Evaluation Type: Inpatient    Problems & Assessment  Problems Diagnosed or Identified: Latch difficulty  Infant Assessment: Skin color: pink or appropriate for ethnicity

## 2018-08-02 VITALS
RESPIRATION RATE: 18 BRPM | TEMPERATURE: 98 F | SYSTOLIC BLOOD PRESSURE: 100 MMHG | HEART RATE: 72 BPM | DIASTOLIC BLOOD PRESSURE: 62 MMHG

## 2018-08-02 NOTE — LACTATION NOTE
This note was copied from a baby's chart.   LACTATION NOTE - INFANT              Feeding Assessment  Latch: Grasps breast, tongue down, lips flanged, rhythmic sucking  Audible Sucks/Swallows: Spontaneous and intermittent (24 hours old)  Type of Nipple: Ever

## 2018-08-02 NOTE — DISCHARGE SUMMARY
[facility] - [facility]    OB/GYNE Discharge Note      Luis Briones Patient Status:  Inpatient    1980 MRN [id number]   Location [facility] 3SE Attending Kati Serna MD   [facility] Day # 3 PCP Rayna Bellamy DO     Admit date:

## 2018-08-20 NOTE — ADDENDUM NOTE
Encounter addended by:  Vale Howard MD on: 8/19/2018 10:21 PM<BR>    Actions taken: Sign clinical note, Visit diagnoses modified, Charge Capture section accepted

## 2018-09-14 ENCOUNTER — POSTPARTUM (OUTPATIENT)
Dept: OBGYN CLINIC | Facility: CLINIC | Age: 38
End: 2018-09-14
Payer: COMMERCIAL

## 2018-09-14 VITALS — WEIGHT: 137 LBS | BODY MASS INDEX: 24 KG/M2
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
## 2019-01-08 NOTE — TELEPHONE ENCOUNTER
Please order repeat U/S ASAP. Call patient with Phone # to schedule. Spoke with Sharif hammer [facility] pt had appt today with dr Susan Manzanares and appt made dr Abena Yanes office feb 12 with Claudene Harden np and dr blackman July 8 11 am also made aware cea lab needs to be done order faxed to [phone]

## 2019-05-20 ENCOUNTER — OFFICE VISIT (OUTPATIENT)
Dept: OBGYN CLINIC | Facility: CLINIC | Age: 39
End: 2019-05-20
Payer: COMMERCIAL

## 2019-05-20 VITALS
BODY MASS INDEX: 22.02 KG/M2 | SYSTOLIC BLOOD PRESSURE: 108 MMHG | WEIGHT: 129 LBS | HEIGHT: 64 IN | DIASTOLIC BLOOD PRESSURE: 62 MMHG

## 2019-05-20 DIAGNOSIS — Z01.419 WELL WOMAN EXAM WITH ROUTINE GYNECOLOGICAL EXAM: Primary | ICD-10-CM

## 2019-05-20 DIAGNOSIS — Z01.419 ENCOUNTER FOR GYNECOLOGICAL EXAMINATION WITHOUT ABNORMAL FINDING: ICD-10-CM

## 2019-05-20 PROCEDURE — 99395 PREV VISIT EST AGE 18-39: CPT | Performed by: OBSTETRICS & GYNECOLOGY

## 2019-05-20 NOTE — PROGRESS NOTES
HPI:    Patient ID: Carlyn Cerda is a 45year old female. Patient here for routine exam.  Baby girl will be one year in July. Still breast feeding.  using condoms. Discussed Paragard IUD for better reliability.   Patient with MTHFR Gene mutation lesions. Pap Done. Adnexa:  No adnexal masses. NT. Musculoskeletal: Normal range of motion. Lymphadenopathy:     She has no cervical adenopathy. Neurological: She is alert and oriented to person, place, and time. Skin: Skin is warm and dry.    Ps

## 2019-07-15 ENCOUNTER — OFFICE VISIT (OUTPATIENT)
Dept: INTEGRATIVE MEDICINE | Facility: CLINIC | Age: 39
End: 2019-07-15
Payer: COMMERCIAL

## 2019-07-15 ENCOUNTER — LAB ENCOUNTER (OUTPATIENT)
Dept: LAB | Facility: HOSPITAL | Age: 39
End: 2019-07-15
Attending: FAMILY MEDICINE
Payer: COMMERCIAL

## 2019-07-15 VITALS
SYSTOLIC BLOOD PRESSURE: 100 MMHG | HEART RATE: 95 BPM | DIASTOLIC BLOOD PRESSURE: 60 MMHG | HEIGHT: 64 IN | BODY MASS INDEX: 22.02 KG/M2 | WEIGHT: 129 LBS | OXYGEN SATURATION: 99 %

## 2019-07-15 DIAGNOSIS — R45.86 MOOD SWINGS: ICD-10-CM

## 2019-07-15 DIAGNOSIS — R00.2 PALPITATIONS: ICD-10-CM

## 2019-07-15 DIAGNOSIS — R53.82 CHRONIC FATIGUE: ICD-10-CM

## 2019-07-15 DIAGNOSIS — Z15.89 MTHFR MUTATION: ICD-10-CM

## 2019-07-15 DIAGNOSIS — R00.2 PALPITATIONS: Primary | ICD-10-CM

## 2019-07-15 LAB
BASOPHILS # BLD AUTO: 0.03 X10(3) UL (ref 0–0.2)
BASOPHILS NFR BLD AUTO: 0.5 %
DEPRECATED HBV CORE AB SER IA-ACNC: 12 NG/ML (ref 12–160)
DEPRECATED RDW RBC AUTO: 39.8 FL (ref 35.1–46.3)
EOSINOPHIL # BLD AUTO: 0.02 X10(3) UL (ref 0–0.7)
EOSINOPHIL NFR BLD AUTO: 0.3 %
ERYTHROCYTE [DISTWIDTH] IN BLOOD BY AUTOMATED COUNT: 11.6 % (ref 11–15)
HAV IGM SER QL: 2.3 MG/DL (ref 1.6–2.6)
HCT VFR BLD AUTO: 41.7 % (ref 35–48)
HCYS SERPL-SCNC: 6.2 UMOL/L (ref 3.2–10.7)
HGB BLD-MCNC: 14.4 G/DL (ref 12–16)
IMM GRANULOCYTES # BLD AUTO: 0.01 X10(3) UL (ref 0–1)
IMM GRANULOCYTES NFR BLD: 0.2 %
LYMPHOCYTES # BLD AUTO: 2.27 X10(3) UL (ref 1–4)
LYMPHOCYTES NFR BLD AUTO: 35.7 %
MCH RBC QN AUTO: 32.4 PG (ref 26–34)
MCHC RBC AUTO-ENTMCNC: 34.5 G/DL (ref 31–37)
MCV RBC AUTO: 93.9 FL (ref 80–100)
MONOCYTES # BLD AUTO: 0.37 X10(3) UL (ref 0.1–1)
MONOCYTES NFR BLD AUTO: 5.8 %
NEUTROPHILS # BLD AUTO: 3.66 X10 (3) UL (ref 1.5–7.7)
NEUTROPHILS # BLD AUTO: 3.66 X10(3) UL (ref 1.5–7.7)
NEUTROPHILS NFR BLD AUTO: 57.5 %
PLATELET # BLD AUTO: 258 10(3)UL (ref 150–450)
RBC # BLD AUTO: 4.44 X10(6)UL (ref 3.8–5.3)
T3FREE SERPL-MCNC: 2.43 PG/ML (ref 2.4–4.2)
T4 FREE SERPL-MCNC: 0.9 NG/DL (ref 0.8–1.7)
THYROPEROXIDASE AB SERPL-ACNC: 67 U/ML (ref ?–60)
TSI SER-ACNC: 1.82 MIU/ML (ref 0.36–3.74)
VIT B12 SERPL-MCNC: 464 PG/ML (ref 193–986)
WBC # BLD AUTO: 6.4 X10(3) UL (ref 4–11)

## 2019-07-15 PROCEDURE — 85025 COMPLETE CBC W/AUTO DIFF WBC: CPT

## 2019-07-15 PROCEDURE — 82607 VITAMIN B-12: CPT

## 2019-07-15 PROCEDURE — 86376 MICROSOMAL ANTIBODY EACH: CPT

## 2019-07-15 PROCEDURE — 84481 FREE ASSAY (FT-3): CPT

## 2019-07-15 PROCEDURE — 99204 OFFICE O/P NEW MOD 45 MIN: CPT | Performed by: FAMILY MEDICINE

## 2019-07-15 PROCEDURE — 84207 ASSAY OF VITAMIN B-6: CPT

## 2019-07-15 PROCEDURE — 84439 ASSAY OF FREE THYROXINE: CPT

## 2019-07-15 PROCEDURE — 86800 THYROGLOBULIN ANTIBODY: CPT

## 2019-07-15 PROCEDURE — 82728 ASSAY OF FERRITIN: CPT

## 2019-07-15 PROCEDURE — 83735 ASSAY OF MAGNESIUM: CPT

## 2019-07-15 PROCEDURE — 36415 COLL VENOUS BLD VENIPUNCTURE: CPT

## 2019-07-15 PROCEDURE — 84443 ASSAY THYROID STIM HORMONE: CPT

## 2019-07-15 PROCEDURE — 86628 CANDIDA ANTIBODY: CPT

## 2019-07-15 PROCEDURE — 83090 ASSAY OF HOMOCYSTEINE: CPT

## 2019-07-15 NOTE — PROGRESS NOTES
Zina Sanches is a 45year old female. Patient presents with:  Palpitations: x3wks      HPI:     Currently breastfeeding, infant is almost one year old; breastmilk supply is dwindling. Getting 1.5 oz when she pumps.    Would like recommendations to improve Misc. Devices (BREAST PUMP) Does not apply Misc DOUBLE ELECTRIC BREAST PUMP EQUIVALENT TO MEDELA PUMP IN STYLE Disp: 1 each Rfl: 0   Enoxaparin Sodium 40 MG/0.4ML Subcutaneous Solution  Disp:  Rfl:    B Complex-Folic Acid (B COMPLEX-VITAMIN B12 OR) Take 1 Forced sexual activity: Not on file    Other Topics      Concerns:         Service: Not Asked        Blood Transfusions: Not Asked        Caffeine Concern: Yes          coffee, decaf        Occupational Exposure: Not Asked        Annabel Acosta - THYROID ANTITHYROGLOBULIN AB; Future  - FREE T3 (TRIIODOTHYRONINE); Future  - CBC WITH DIFFERENTIAL WITH PLATELET; Future  - FERRITIN; Future  - HOMOCYSTEINE; Future  - CARD MONITOR HOLTER 24 HOUR (CPT=22667); Future    2.  Mood swings  - WON IGG/A/M CBC W Differential W Platelet [E]      Ferritin [E]      Homocysteine [E]    No orders of the defined types were placed in this encounter.       Patient Instructions   The products and items listed below (the “Products”)  and their claims have not been Some good sources:  - in Health food stores: [employer] or Dr. Yisroel Dubin brands  - multiple websites online: [employer] Bio-d-mulsion Forte (liquid)    I recommend taking 5004-1047 IU daily ongoing    [address]  The

## 2019-07-15 NOTE — PATIENT INSTRUCTIONS
The products and items listed below (the “Products”)  and their claims have not been evaluated by the Food and Drug Administration. Dietary products are not intended to treat, prevent, mitigate or cure disease.  Ultimately, you must draw your own conclusion taking 4192-9174 IU daily ongoing    [address]  The only test that provides a comprehensive extracellular and intracellular assessment of the levels of the most important vitamins, minerals, antioxidants, fatty acids and ami

## 2019-07-16 LAB — THYROGLOB SERPL-MCNC: <15 U/ML (ref ?–60)

## 2019-07-18 LAB — VITAMIN B6: 44.5 NMOL/L

## 2019-07-21 LAB
CANDIDA ANTIBODY IGA: 1.16 EV
CANDIDA ANTIBODY IGG: 0.44 EV
CANDIDA ANTIBODY IGM: 1.01 EV

## 2019-08-01 NOTE — TELEPHONE ENCOUNTER
2/21/17 Called pt back and informed her of Dr. Tushar Gilliam recommendations. Pt asked if she can take both Zyrtec and Benadryl together. I asked Dr. Juanita Santiago and per [facility], pt should try taking Zyrtect during the day and Benadryl at night.  Pt agreed and did not h
Can resume Zyrtec as needed. OK in pregnancy. Probably allergy combined with a cold. Should do all that you advised. Can also use Benadryl Allergy.
PT STATE SHE'S CONGESTED / PT STATE SHE'S 7 WEEKS / PLS ADV
Per pt is about 7 weeks c/o having sneezing and congestion x6 days. Started feeling worse since Friday. Suffers from allergies, but doesn't know if its a cold  now or congestion due to pregnancy.   Used to take Zertec every day, but when she found out she w
sent back from [facility] for tachycardia, hypertension, and increased lethargy. d/c from [facility] today to [facility]. lethargic in triage but awakens to voice. no IV access in place

## 2019-09-03 ENCOUNTER — TELEPHONE (OUTPATIENT)
Dept: INTEGRATIVE MEDICINE | Facility: CLINIC | Age: 39
End: 2019-09-03

## 2019-09-03 NOTE — TELEPHONE ENCOUNTER
Please clarify, patient would like to know if she should still keep her appointment for Thursday (patient has not received heart monitor yet).

## 2020-03-17 ENCOUNTER — NURSE TRIAGE (OUTPATIENT)
Dept: OTHER | Age: 40
End: 2020-03-17

## 2020-03-17 NOTE — TELEPHONE ENCOUNTER
Action Requested: Summary for Provider     []  Critical Lab, Recommendations Needed  [] Need Additional Advice  []   FYI    []   Need Orders  [] Need Medications Sent to Pharmacy  []  Other     SUMMARY: pt complains of back pain 10days ago.   Then 3 days ag

## 2020-03-18 ENCOUNTER — OFFICE VISIT (OUTPATIENT)
Dept: FAMILY MEDICINE CLINIC | Facility: CLINIC | Age: 40
End: 2020-03-18
Payer: COMMERCIAL

## 2020-03-18 VITALS
DIASTOLIC BLOOD PRESSURE: 77 MMHG | HEIGHT: 64 IN | WEIGHT: 137 LBS | SYSTOLIC BLOOD PRESSURE: 116 MMHG | TEMPERATURE: 98 F | HEART RATE: 84 BPM | BODY MASS INDEX: 23.39 KG/M2

## 2020-03-18 DIAGNOSIS — M77.11 LATERAL EPICONDYLITIS OF RIGHT ELBOW: ICD-10-CM

## 2020-03-18 DIAGNOSIS — M25.511 ACUTE PAIN OF RIGHT SHOULDER: Primary | ICD-10-CM

## 2020-03-18 PROCEDURE — 99213 OFFICE O/P EST LOW 20 MIN: CPT | Performed by: FAMILY MEDICINE

## 2020-03-18 RX ORDER — METHYLPREDNISOLONE 4 MG/1
TABLET ORAL
Qty: 1 KIT | Refills: 0 | Status: SHIPPED | OUTPATIENT
Start: 2020-03-18 | End: 2021-07-21

## 2020-03-18 RX ORDER — CYCLOBENZAPRINE HCL 5 MG
5 TABLET ORAL 3 TIMES DAILY PRN
Qty: 20 TABLET | Refills: 0 | Status: SHIPPED | OUTPATIENT
Start: 2020-03-18 | End: 2021-07-21

## 2020-03-18 NOTE — PATIENT INSTRUCTIONS
Understanding Lateral Epicondylitis    Tendons are strong bands of tissue that connect muscles to bones. Lateral epicondylitis affects the tendons that connect muscles in the forearm to the lateral epicondyle.  This is the bony knob on the outer side of t over-the-counter pain medicines may help reduce pain and swelling.    · Wearing a brace. This helps reduce strain on the muscles and tendons in the forearm, which may relieve symptoms. It is very important to wear the brace properly.   · Doing exercises and

## 2020-03-18 NOTE — PROGRESS NOTES
HPI: SHARI is a 44year old female who presents for right shoulder pain. Started in right shoulder last Sunday. Manageable at first but then started to move down right arm. Went to [facility] last Wednesday. Used heat and given exercises.   Pain has not resolved epicondyles. Assessment:/Plan:  Acute pain of right shoulder    Lateral epicondylitis of right elbow      Will start Medrol Dosepak. Advised that she take with food and not take other anti-inflammatories.   Will also start muscle relaxant to be used bef

## 2020-07-06 NOTE — PROGRESS NOTES
Pt here today for OB RN Education visit, Educational material reviewed. Pt verbalized understanding.  Rx given for pn labs, Hcv, cystic fibrosis blood test, and TSH per pt's request. 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

## 2020-10-28 ENCOUNTER — IMMUNIZATION (OUTPATIENT)
Dept: FAMILY MEDICINE CLINIC | Facility: CLINIC | Age: 40
End: 2020-10-28
Payer: COMMERCIAL

## 2020-10-28 DIAGNOSIS — Z23 NEED FOR VACCINATION: ICD-10-CM

## 2020-10-28 PROCEDURE — 90686 IIV4 VACC NO PRSV 0.5 ML IM: CPT | Performed by: PHYSICIAN ASSISTANT

## 2020-10-28 PROCEDURE — 90471 IMMUNIZATION ADMIN: CPT | Performed by: PHYSICIAN ASSISTANT

## 2021-04-21 ENCOUNTER — IMMUNIZATION (OUTPATIENT)
Dept: LAB | Facility: HOSPITAL | Age: 41
End: 2021-04-21
Attending: EMERGENCY MEDICINE
Payer: COMMERCIAL

## 2021-04-21 DIAGNOSIS — Z23 NEED FOR VACCINATION: Primary | ICD-10-CM

## 2021-04-21 PROCEDURE — 0011A SARSCOV2 VAC 100MCG/0.5ML IM: CPT

## 2021-05-19 ENCOUNTER — IMMUNIZATION (OUTPATIENT)
Dept: LAB | Facility: HOSPITAL | Age: 41
End: 2021-05-19
Attending: EMERGENCY MEDICINE
Payer: COMMERCIAL

## 2021-05-19 DIAGNOSIS — Z23 NEED FOR VACCINATION: Primary | ICD-10-CM

## 2021-05-19 PROCEDURE — 0012A SARSCOV2 VAC 100MCG/0.5ML IM: CPT

## 2021-07-19 ENCOUNTER — NURSE TRIAGE (OUTPATIENT)
Dept: FAMILY MEDICINE CLINIC | Facility: CLINIC | Age: 41
End: 2021-07-19

## 2021-07-19 NOTE — TELEPHONE ENCOUNTER
Action Requested: Summary for Provider     []  Critical Lab, Recommendations Needed  [] Need Additional Advice  []   FYI    []   Need Orders  [] Need Medications Sent to Pharmacy  []  Other     SUMMARY: Pt c/o increased anxiety and possibly depression

## 2021-07-21 NOTE — PROGRESS NOTES
HPI: Anni Fernandez is a 36year old female who presents for anxiety. Follows with therapist. Ausitn Gist by different things. Daughter had an accident. Feels like she has some PTSD as well. Difficulty sleeping. Has decreased appetite. Gets palpitations.   Freezes common side effects. We will follow-up in 1 month. Chronic pain of right knee    Exam seems to reveal that she likely has patellofemoral syndrome. She has mild symptoms in the left knee as well. Exercises given to do at home.   If no improvement in 4

## 2021-09-04 ENCOUNTER — TELEPHONE (OUTPATIENT)
Dept: FAMILY MEDICINE CLINIC | Facility: CLINIC | Age: 41
End: 2021-09-04

## 2021-09-05 ENCOUNTER — HOSPITAL ENCOUNTER (OUTPATIENT)
Age: 41
Discharge: HOME | End: 2021-09-05
Payer: COMMERCIAL

## 2021-09-05 VITALS
TEMPERATURE: 98 F | RESPIRATION RATE: 21 BRPM | SYSTOLIC BLOOD PRESSURE: 112 MMHG | OXYGEN SATURATION: 99 % | DIASTOLIC BLOOD PRESSURE: 80 MMHG | HEART RATE: 89 BPM

## 2021-09-05 DIAGNOSIS — M25.511 ACUTE PAIN OF RIGHT SHOULDER: Primary | ICD-10-CM

## 2021-09-05 PROCEDURE — 99203 OFFICE O/P NEW LOW 30 MIN: CPT | Performed by: NURSE PRACTITIONER

## 2021-09-05 PROCEDURE — 96372 THER/PROPH/DIAG INJ SC/IM: CPT | Performed by: NURSE PRACTITIONER

## 2021-09-05 RX ORDER — KETOROLAC TROMETHAMINE 30 MG/ML
60 INJECTION, SOLUTION INTRAMUSCULAR; INTRAVENOUS ONCE
Status: COMPLETED | OUTPATIENT
Start: 2021-09-05 | End: 2021-09-05

## 2021-09-05 RX ORDER — DIAZEPAM 2 MG/1
2 TABLET ORAL 3 TIMES DAILY PRN
Qty: 20 TABLET | Refills: 0 | Status: SHIPPED | OUTPATIENT
Start: 2021-09-05 | End: 2021-09-12

## 2021-09-05 RX ORDER — METHYLPREDNISOLONE 4 MG/1
TABLET ORAL
Qty: 1 EACH | Refills: 0 | Status: SHIPPED | OUTPATIENT
Start: 2021-09-05

## 2021-09-05 RX ORDER — DIAZEPAM 5 MG/1
5 TABLET ORAL ONCE
Status: COMPLETED | OUTPATIENT
Start: 2021-09-05 | End: 2021-09-05

## 2021-09-05 NOTE — ED PROVIDER NOTES
Patient Seen in: Immediate [facility]      History   Patient presents with:  Shoulder Pain    Stated Complaint: LT SHOULDER AND BACK PAIN    HPI/Subjective:   HPI    This is a well-appearing 25-year-old female who presents with right shoulder pain rad Constitutional and vital signs reviewed. All other systems reviewed and negative except as noted above.     Physical Exam     ED Triage Vitals [09/05/21 0957]   /80   Pulse 89   Resp 21   Temp 97.7 °F (36.5 °C)   Temp src Temporal   SpO2 99 %   O Thought Content: Thought content normal.         Judgment: Judgment normal.       ED Course   Labs Reviewed - No data to display    Valium, Toradol IM and reevaluate. Patient reports improvement in symptoms since receiving medication.   MDM

## 2021-09-05 NOTE — ED INITIAL ASSESSMENT (HPI)
Pt here with right shoulder pain, pt states she developed severe shoulder pain yesterday, pt denies any injury but states she in a medical procedure 2 days ago  with her daughter and was with her arm extended out holding her daughters hand for a half an ho

## 2021-09-07 ENCOUNTER — OFFICE VISIT (OUTPATIENT)
Dept: FAMILY MEDICINE CLINIC | Facility: CLINIC | Age: 41
End: 2021-09-07
Payer: COMMERCIAL

## 2021-09-07 VITALS
HEIGHT: 64 IN | WEIGHT: 142 LBS | HEART RATE: 67 BPM | TEMPERATURE: 98 F | DIASTOLIC BLOOD PRESSURE: 68 MMHG | BODY MASS INDEX: 24.24 KG/M2 | SYSTOLIC BLOOD PRESSURE: 102 MMHG

## 2021-09-07 DIAGNOSIS — M25.511 ACUTE PAIN OF RIGHT SHOULDER: ICD-10-CM

## 2021-09-07 DIAGNOSIS — G58.9 PINCHED NERVE: ICD-10-CM

## 2021-09-07 PROCEDURE — 3008F BODY MASS INDEX DOCD: CPT | Performed by: FAMILY MEDICINE

## 2021-09-07 PROCEDURE — 99213 OFFICE O/P EST LOW 20 MIN: CPT | Performed by: FAMILY MEDICINE

## 2021-09-07 PROCEDURE — 3074F SYST BP LT 130 MM HG: CPT | Performed by: FAMILY MEDICINE

## 2021-09-07 PROCEDURE — 3078F DIAST BP <80 MM HG: CPT | Performed by: FAMILY MEDICINE

## 2021-09-07 RX ORDER — HYDROCODONE BITARTRATE AND ACETAMINOPHEN 5; 325 MG/1; MG/1
1 TABLET ORAL
COMMUNITY
Start: 2021-09-06 | End: 2021-09-07

## 2021-09-07 RX ORDER — HYDROCODONE BITARTRATE AND ACETAMINOPHEN 5; 325 MG/1; MG/1
1 TABLET ORAL EVERY 6 HOURS PRN
Qty: 30 TABLET | Refills: 0 | Status: SHIPPED | OUTPATIENT
Start: 2021-09-07

## 2021-09-07 NOTE — TELEPHONE ENCOUNTER
Message #          09/04/2021 07:16p   [AMANDAM]  To:  From:  ZION Whitehead MD:  Phone#:  ----------------------------------------------------------------------  RE PT IGLESIA LOZA, [phone] 12-, PAIN RADIATING FROM LOWER BACK TO SHOULDER

## 2021-09-07 NOTE — PROGRESS NOTES
HPI/Subjective:   Patient ID: Mariah Sanz is a 36year old female. Pt presents for follow up from the urgent care for pain of the right shoulder area. Pt denies any sig injury or trauma. Pt had similar symptoms before. Pt did get an toradol injection. significant symptoms; renewed norco as requested; follow up with her usual MD for further evaluation and treatment. No orders of the defined types were placed in this encounter.       Meds This Visit:  Requested Prescriptions     Signed Prescriptions Tono Osuna

## 2021-09-08 ENCOUNTER — HOSPITAL ENCOUNTER (OUTPATIENT)
Dept: GENERAL RADIOLOGY | Age: 41
Discharge: HOME | End: 2021-09-08
Attending: FAMILY MEDICINE
Payer: COMMERCIAL

## 2021-09-08 ENCOUNTER — TELEPHONE (OUTPATIENT)
Dept: FAMILY MEDICINE CLINIC | Facility: CLINIC | Age: 41
End: 2021-09-08

## 2021-09-08 DIAGNOSIS — M25.511 CHRONIC RIGHT SHOULDER PAIN: ICD-10-CM

## 2021-09-08 DIAGNOSIS — M25.511 CHRONIC RIGHT SHOULDER PAIN: Primary | ICD-10-CM

## 2021-09-08 DIAGNOSIS — R20.0 NUMBNESS AND TINGLING OF RIGHT ARM: ICD-10-CM

## 2021-09-08 DIAGNOSIS — R20.2 NUMBNESS AND TINGLING OF RIGHT ARM: ICD-10-CM

## 2021-09-08 DIAGNOSIS — G89.29 CHRONIC RIGHT SHOULDER PAIN: Primary | ICD-10-CM

## 2021-09-08 DIAGNOSIS — G89.29 CHRONIC RIGHT SHOULDER PAIN: ICD-10-CM

## 2021-09-08 PROCEDURE — 72040 X-RAY EXAM NECK SPINE 2-3 VW: CPT | Performed by: FAMILY MEDICINE

## 2021-09-08 PROCEDURE — 73030 X-RAY EXAM OF SHOULDER: CPT | Performed by: FAMILY MEDICINE

## 2021-09-08 RX ORDER — CYCLOBENZAPRINE HCL 5 MG
5 TABLET ORAL NIGHTLY PRN
Qty: 30 TABLET | Refills: 0 | Status: SHIPPED | OUTPATIENT
Start: 2021-09-08

## 2021-09-08 NOTE — TELEPHONE ENCOUNTER
Patient contacted, patient agreed to not take valium.  She will not take lexapro with the cyclobenzaprine or Norco. She plans to take cyclobenzaprine 2-3 hours before bed and take Norco during the night if she has pain

## 2021-09-08 NOTE — TELEPHONE ENCOUNTER
Verified pt name and . Pt states she had stopped taking the Valium as prescribed on 21 as she was seen in [address] [place] [address] and was advised not to take the Valium as the dose was too low.  Pt states she would like to have the Cyclobenzaprine prescribed as pt

## 2021-09-08 NOTE — TELEPHONE ENCOUNTER
The UC also prescribed Valium. Is she using that? If it isn't helping, we can switch to Cyclobenzaprine (Flexeril). I also ordered xr of the right shoulder and cervical spine. I placed a referral to Physiatry as well in case pain does not improve.

## 2021-09-08 NOTE — TELEPHONE ENCOUNTER
Spoke with patient twice this last weekend. Have advised her to go to urgent care when she did she was given some medication but is still in pain talk to her the next day and had her go to the emergency room. Patient verbalizes understanding of that.

## 2021-09-08 NOTE — TELEPHONE ENCOUNTER
Message # [id number]         2021 07:37p   [LINDAV]  To:  From:  ZION Whitehead MD:  Phone#:  ----------------------------------------------------------------------  Kae Varsha [phone],  [phone], HAS SEVERE PAIN IN RIGHT SHOULDER TO ELBOW AND SOME

## 2021-09-08 NOTE — TELEPHONE ENCOUNTER
Seen in UC 9-5  Went to [facility] ER 9-6  LOV 9-7-21 with       States she is not feeling better. Taking Norco, Prednisone. Pain to right neck, radiates down right arm, to fingertips which feel tingly. Googled her symptoms and says its a C7 injury.   Jose David Wne

## 2021-09-29 NOTE — TELEPHONE ENCOUNTER
From OV on 07/21:  Assessment:/Plan:  Anxiety  (primary encounter diagnosis)    Symptoms are chronic and daily. They affect her ADLs. Patient has taken Valium in the past and is uninterested in any medication that may cause addiction.   Discussed daily an

## 2021-09-30 RX ORDER — ESCITALOPRAM OXALATE 5 MG/1
TABLET ORAL
Qty: 30 TABLET | Refills: 1 | Status: SHIPPED | OUTPATIENT
Start: 2021-09-30 | End: 2021-12-03

## 2021-12-03 RX ORDER — ESCITALOPRAM OXALATE 5 MG/1
TABLET ORAL
Qty: 30 TABLET | Refills: 1 | Status: SHIPPED | OUTPATIENT
Start: 2021-12-03 | End: 2022-02-07

## 2022-02-07 RX ORDER — ESCITALOPRAM OXALATE 5 MG/1
5 TABLET ORAL DAILY
Qty: 30 TABLET | Refills: 0 | Status: SHIPPED | OUTPATIENT
Start: 2022-02-07 | End: 2022-02-15

## 2022-02-07 NOTE — TELEPHONE ENCOUNTER
Please review. Protocol Failed / No Protocol.     Requested Prescriptions   Pending Prescriptions Disp Refills    ESCITALOPRAM 5 MG Oral Tab [Pharmacy Med Name: ESCITALOPRAM 5MG TABLETS] 30 tablet 1     Sig: TAKE 1 TABLET(5 MG) BY MOUTH DAILY        Psychiatric Non-Scheduled (Anti-Anxiety) Failed - 2/5/2022  8:56 AM        Failed - Appointment in last 6 or next 3 months              Recent Outpatient Visits              5 months ago Acute pain of right shoulder    Mello Kim MD    Office Visit    6 months ago [address], [place] [address], [place], [place]    Office Visit    1 year ago Acute pain of right shoulder    [facility], [facility], [place] [address], [place],     Office Visit    2 years ago [facility],     Office Visit    2 years ago Well woman exam with routine gynecological exam    [facility] Connor De La Fuente MD    Office Visit

## 2022-02-09 NOTE — TELEPHONE ENCOUNTER
Patient calling, identified name and , informed RX was sent     Made f/u appt   Future Appointments   Date Time Provider Brayan Hull   3/1/2022 10:15 AM DO BARD Melo

## 2022-02-15 RX ORDER — ESCITALOPRAM OXALATE 5 MG/1
5 TABLET ORAL DAILY
Qty: 90 TABLET | Refills: 0 | Status: SHIPPED | OUTPATIENT
Start: 2022-02-15 | End: 2022-03-01

## 2022-02-15 NOTE — TELEPHONE ENCOUNTER
90 day refill given on 02/15/22, appointment needed for further refills. Left message on pharmacy voicemail to cancel script sent on 2/7/22  Patient aware that script sent to pharmacy  Requested Prescriptions   Pending Prescriptions Disp Refills    escitalopram 5 MG Oral Tab 30 tablet 0     Sig: Take 1 tablet (5 mg total) by mouth daily. Psychiatric Non-Scheduled (Anti-Anxiety) Passed - 2/15/2022 11:30 AM        Passed - Appointment in last 6 or next 3 months          Signed Prescriptions Disp Refills    escitalopram 5 MG Oral Tab 30 tablet 0     Sig: Take 1 tablet (5 mg total) by mouth daily.         Psychiatric Non-Scheduled (Anti-Anxiety) Failed - 2/5/2022  8:56 AM        Failed - Appointment in last 6 or next 3 months            Future Appointments         Provider Department Appt Notes    In 2 weeks Isaac Kumari DO [facility], [facility], [facility] [address] f/u meds Escitalopram     In 2 weeks [facility] Both knees          Recent Outpatient Visits              5 months ago Acute pain of right shoulder    Drake Justice MD    Office Visit    6 months ago [facility] [address], [place], [place]    Office Visit    1 year ago Acute pain of right shoulder    [facility], [facility], [place], [place]    Office Visit    2 years ago [facility]     Office Visit    2 years ago Well woman exam with routine gynecological exam    [facility] [employer], [facility] Donavon Armendariz MD    Office Visit

## 2022-02-15 NOTE — TELEPHONE ENCOUNTER
Patient still needs Escitalopram from [facility] on file. Patient is scheduled with Dr. Leandra Meza on 3-1; states she's doing well on the medicine. However, [facility] told the patient that the 30-day supply that was sent in will be $80.  If Dr. Leandra Meza would write a 90-day supply, will only cost her $10.  Patient wants a call from the nurse whether this can be done.

## 2022-03-01 ENCOUNTER — OFFICE VISIT (OUTPATIENT)
Dept: PHYSICAL MEDICINE AND REHAB | Facility: CLINIC | Age: 42
End: 2022-03-01
Payer: COMMERCIAL

## 2022-03-01 ENCOUNTER — LAB ENCOUNTER (OUTPATIENT)
Dept: LAB | Age: 42
End: 2022-03-01
Attending: FAMILY MEDICINE
Payer: COMMERCIAL

## 2022-03-01 VITALS
WEIGHT: 145 LBS | SYSTOLIC BLOOD PRESSURE: 126 MMHG | DIASTOLIC BLOOD PRESSURE: 76 MMHG | BODY MASS INDEX: 24.16 KG/M2 | HEART RATE: 76 BPM | HEIGHT: 65 IN | OXYGEN SATURATION: 98 %

## 2022-03-01 DIAGNOSIS — M54.12 CHRONIC CERVICAL RADICULOPATHY: Primary | ICD-10-CM

## 2022-03-01 DIAGNOSIS — Z00.00 ROUTINE PHYSICAL EXAMINATION: ICD-10-CM

## 2022-03-01 LAB
ALBUMIN SERPL-MCNC: 4.1 G/DL (ref 3.4–5)
ALBUMIN/GLOB SERPL: 1.2 {RATIO} (ref 1–2)
ALP LIVER SERPL-CCNC: 61 U/L
ALT SERPL-CCNC: 15 U/L
ANION GAP SERPL CALC-SCNC: 5 MMOL/L (ref 0–18)
AST SERPL-CCNC: 11 U/L (ref 15–37)
BILIRUB SERPL-MCNC: 0.5 MG/DL (ref 0.1–2)
BUN BLD-MCNC: 7 MG/DL (ref 7–18)
BUN/CREAT SERPL: 10 (ref 10–20)
CALCIUM BLD-MCNC: 9 MG/DL (ref 8.5–10.1)
CHLORIDE SERPL-SCNC: 104 MMOL/L (ref 98–112)
CHOLEST SERPL-MCNC: 163 MG/DL (ref ?–200)
CO2 SERPL-SCNC: 27 MMOL/L (ref 21–32)
CREAT BLD-MCNC: 0.7 MG/DL
DEPRECATED RDW RBC AUTO: 45.1 FL (ref 35.1–46.3)
ERYTHROCYTE [DISTWIDTH] IN BLOOD BY AUTOMATED COUNT: 12.9 % (ref 11–15)
FASTING PATIENT LIPID ANSWER: YES
FASTING STATUS PATIENT QL REPORTED: YES
GLOBULIN PLAS-MCNC: 3.3 G/DL (ref 2.8–4.4)
GLUCOSE BLD-MCNC: 82 MG/DL (ref 70–99)
HCT VFR BLD AUTO: 39.9 %
HDLC SERPL-MCNC: 77 MG/DL (ref 40–59)
HGB BLD-MCNC: 13.1 G/DL
LDLC SERPL CALC-MCNC: 74 MG/DL (ref ?–100)
MCH RBC QN AUTO: 31.7 PG (ref 26–34)
MCHC RBC AUTO-ENTMCNC: 32.8 G/DL (ref 31–37)
MCV RBC AUTO: 96.6 FL
NONHDLC SERPL-MCNC: 86 MG/DL (ref ?–130)
OSMOLALITY SERPL CALC.SUM OF ELEC: 279 MOSM/KG (ref 275–295)
PLATELET # BLD AUTO: 277 10(3)UL (ref 150–450)
POTASSIUM SERPL-SCNC: 3.7 MMOL/L (ref 3.5–5.1)
PROT SERPL-MCNC: 7.4 G/DL (ref 6.4–8.2)
RBC # BLD AUTO: 4.13 X10(6)UL
SODIUM SERPL-SCNC: 136 MMOL/L (ref 136–145)
TRIGL SERPL-MCNC: 57 MG/DL (ref 30–149)
TSI SER-ACNC: 1.39 MIU/ML (ref 0.36–3.74)

## 2022-03-01 PROCEDURE — 36415 COLL VENOUS BLD VENIPUNCTURE: CPT

## 2022-03-01 PROCEDURE — 3078F DIAST BP <80 MM HG: CPT | Performed by: PHYSICAL MEDICINE & REHABILITATION

## 2022-03-01 PROCEDURE — 84443 ASSAY THYROID STIM HORMONE: CPT

## 2022-03-01 PROCEDURE — 80053 COMPREHEN METABOLIC PANEL: CPT

## 2022-03-01 PROCEDURE — 3074F SYST BP LT 130 MM HG: CPT | Performed by: PHYSICAL MEDICINE & REHABILITATION

## 2022-03-01 PROCEDURE — 3008F BODY MASS INDEX DOCD: CPT | Performed by: PHYSICAL MEDICINE & REHABILITATION

## 2022-03-01 PROCEDURE — 99244 OFF/OP CNSLTJ NEW/EST MOD 40: CPT | Performed by: PHYSICAL MEDICINE & REHABILITATION

## 2022-03-01 PROCEDURE — 80061 LIPID PANEL: CPT

## 2022-03-01 PROCEDURE — 85027 COMPLETE CBC AUTOMATED: CPT

## 2022-03-01 RX ORDER — MELOXICAM 15 MG/1
15 TABLET ORAL DAILY
Qty: 14 TABLET | Refills: 0 | Status: SHIPPED | OUTPATIENT
Start: 2022-03-01 | End: 2022-03-15

## 2022-03-03 PROBLEM — M54.12 CHRONIC CERVICAL RADICULOPATHY: Status: ACTIVE | Noted: 2022-03-03

## 2022-06-15 RX ORDER — ESCITALOPRAM OXALATE 5 MG/1
TABLET ORAL
Qty: 90 TABLET | Refills: 3 | Status: SHIPPED | OUTPATIENT
Start: 2022-06-15

## 2022-07-12 NOTE — TELEPHONE ENCOUNTER
Informed pt she will take Heparin only during pregnancy and then will switch back to Lovenox back after she delivers. Pt voices understanding. Bilobed Flap Text: The defect edges were debeveled with a #15 scalpel blade.  Given the location of the defect and the proximity to free margins a bilobe flap was deemed most appropriate.  Using a sterile surgical marker, an appropriate bilobe flap drawn around the defect.    The area thus outlined was incised deep to adipose tissue with a #15 scalpel blade.  The skin margins were undermined to an appropriate distance in all directions utilizing iris scissors.

## 2022-09-02 ENCOUNTER — NURSE TRIAGE (OUTPATIENT)
Dept: FAMILY MEDICINE CLINIC | Facility: CLINIC | Age: 42
End: 2022-09-02

## 2022-09-03 ENCOUNTER — TELEMEDICINE (OUTPATIENT)
Dept: FAMILY MEDICINE CLINIC | Facility: CLINIC | Age: 42
End: 2022-09-03

## 2022-09-03 DIAGNOSIS — J06.9 UPPER RESPIRATORY TRACT INFECTION, UNSPECIFIED TYPE: ICD-10-CM

## 2022-09-03 DIAGNOSIS — F41.9 ANXIETY: ICD-10-CM

## 2022-09-03 DIAGNOSIS — R49.0 HOARSENESS OF VOICE: Primary | ICD-10-CM

## 2022-09-03 PROCEDURE — 99214 OFFICE O/P EST MOD 30 MIN: CPT | Performed by: FAMILY MEDICINE

## 2022-09-03 RX ORDER — ALPRAZOLAM 0.25 MG/1
0.25 TABLET ORAL 2 TIMES DAILY PRN
Qty: 30 TABLET | Refills: 0 | Status: SHIPPED | OUTPATIENT
Start: 2022-09-03

## 2022-09-03 RX ORDER — ESCITALOPRAM OXALATE 10 MG/1
10 TABLET ORAL DAILY
Qty: 90 TABLET | Refills: 1 | Status: SHIPPED | OUTPATIENT
Start: 2022-09-03

## 2022-10-27 NOTE — PROGRESS NOTES
Email to have new patient folder sent to patient's home.   Request for appointments to be added for patient.    Payton Mcdaniel is a 40year old female. Reason for Consult:   AMA. Nora test was low risk. 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 Currently on Lovenox 40mg daily.     Doing well. No complaints.     Re Cholecalciferol (VITAMIN D3) 59507 units Oral Cap Take 1 tablet by mouth once a week.  Disp:  Rfl:         HISTORY:  Past Medical History:   Diagnosis Date   • Decorative tattoo    • Factor II deficiency ([facility]    • Hemorrhage post partum    no transfusion

## 2022-11-16 NOTE — TELEPHONE ENCOUNTER
Muscle relaxer called in. I am fine with her waiting to schedule appointment to see if muscle relaxer helps. Hospital chart (includes HIE)

## 2022-12-14 ENCOUNTER — NURSE TRIAGE (OUTPATIENT)
Dept: FAMILY MEDICINE CLINIC | Facility: CLINIC | Age: 42
End: 2022-12-14

## 2022-12-14 ENCOUNTER — HOSPITAL ENCOUNTER (OUTPATIENT)
Age: 42
Discharge: HOME | End: 2022-12-14
Payer: COMMERCIAL

## 2022-12-14 VITALS
DIASTOLIC BLOOD PRESSURE: 75 MMHG | RESPIRATION RATE: 20 BRPM | TEMPERATURE: 101 F | HEART RATE: 109 BPM | SYSTOLIC BLOOD PRESSURE: 102 MMHG | OXYGEN SATURATION: 99 %

## 2022-12-14 DIAGNOSIS — J11.1 INFLUENZA: Primary | ICD-10-CM

## 2022-12-14 DIAGNOSIS — R05.1 ACUTE COUGH: ICD-10-CM

## 2022-12-14 LAB
POCT INFLUENZA A: POSITIVE
POCT INFLUENZA B: NEGATIVE
SARS-COV-2 RNA RESP QL NAA+PROBE: NOT DETECTED

## 2022-12-14 PROCEDURE — 87502 INFLUENZA DNA AMP PROBE: CPT | Performed by: NURSE PRACTITIONER

## 2022-12-14 PROCEDURE — A9150 MISC/EXPER NON-PRESCRIPT DRU: HCPCS | Performed by: NURSE PRACTITIONER

## 2022-12-14 PROCEDURE — U0002 COVID-19 LAB TEST NON-CDC: HCPCS | Performed by: NURSE PRACTITIONER

## 2022-12-14 PROCEDURE — 99213 OFFICE O/P EST LOW 20 MIN: CPT | Performed by: NURSE PRACTITIONER

## 2022-12-14 RX ORDER — ACETAMINOPHEN 500 MG
1000 TABLET ORAL ONCE
Status: COMPLETED | OUTPATIENT
Start: 2022-12-14 | End: 2022-12-14

## 2023-04-21 ENCOUNTER — TELEPHONE (OUTPATIENT)
Dept: FAMILY MEDICINE CLINIC | Facility: CLINIC | Age: 43
End: 2023-04-21

## 2023-04-21 NOTE — TELEPHONE ENCOUNTER
Future Appointments   Date Time Provider Brayan Hull   5/3/2023 11:00 AM [facility] [address]   6/27/2023 11:15 AM DO BRAD Antonio Nii     Patent has a scheduled annual physical on 6/27/23    Per [facility]:  \"Teens and young adults should be vaccinated too  Everyone through age 32 years should get HPV vaccine if they were not fully vaccinated already. HPV vaccination is not recommended for everyone older than age 32 years. Some adults age 32 through 39 years who were not already vaccinated might choose to get HPV vaccine after speaking with their doctor about their risk for new HPV infections and possible benefits of vaccination for them. HPV vaccination of adults provides less benefit, because more people in this age range were exposed to HPV already. \"    [email or web address] (MM)/.     I called patient and mailbox was full --> sent a [employer] message with information above [1st attempt]

## 2023-04-21 NOTE — TELEPHONE ENCOUNTER
Patient called requesting clarification on an add she saw on if under the age of 40 should have hpv vaccine. Is that something she should take into consideration.

## 2023-04-25 RX ORDER — ESCITALOPRAM OXALATE 10 MG/1
10 TABLET ORAL DAILY
Qty: 90 TABLET | Refills: 1 | Status: CANCELLED | OUTPATIENT
Start: 2023-04-25

## 2023-04-26 RX ORDER — ESCITALOPRAM OXALATE 5 MG/1
5 TABLET ORAL DAILY
Qty: 90 TABLET | Refills: 3 | Status: SHIPPED | OUTPATIENT
Start: 2023-04-26

## 2023-04-26 NOTE — TELEPHONE ENCOUNTER
Patient states she has been taking Escitalopram 5 mg daily instead of Escitalopram 10 mg for about 60 days and is asking for new prescription for Escitalopram 5 mg. Medication pended for your review and approval.        Patient comment: I have been taking the 5mg vs. 10mg for about 60 days now. Could you order the 5mg instead of 10mg? Thanks!

## 2023-05-03 ENCOUNTER — HOSPITAL ENCOUNTER (OUTPATIENT)
Dept: MAMMOGRAPHY | Age: 43
Discharge: HOME | End: 2023-05-03
Attending: FAMILY MEDICINE
Payer: COMMERCIAL

## 2023-05-03 DIAGNOSIS — Z12.31 ENCOUNTER FOR SCREENING MAMMOGRAM FOR MALIGNANT NEOPLASM OF BREAST: ICD-10-CM

## 2023-05-03 PROCEDURE — 77067 SCR MAMMO BI INCL CAD: CPT | Performed by: FAMILY MEDICINE

## 2023-05-03 PROCEDURE — 77063 BREAST TOMOSYNTHESIS BI: CPT | Performed by: FAMILY MEDICINE

## 2023-05-09 ENCOUNTER — HOSPITAL ENCOUNTER (OUTPATIENT)
Dept: ULTRASOUND IMAGING | Facility: HOSPITAL | Age: 43
Discharge: HOME | End: 2023-05-09
Attending: FAMILY MEDICINE
Payer: COMMERCIAL

## 2023-05-09 ENCOUNTER — HOSPITAL ENCOUNTER (OUTPATIENT)
Dept: MAMMOGRAPHY | Facility: HOSPITAL | Age: 43
Discharge: HOME | End: 2023-05-09
Attending: FAMILY MEDICINE
Payer: COMMERCIAL

## 2023-05-09 DIAGNOSIS — R92.8 ABNORMAL MAMMOGRAM: ICD-10-CM

## 2023-05-09 PROCEDURE — 77065 DX MAMMO INCL CAD UNI: CPT | Performed by: FAMILY MEDICINE

## 2023-05-09 PROCEDURE — 77061 BREAST TOMOSYNTHESIS UNI: CPT | Performed by: FAMILY MEDICINE

## 2023-05-09 PROCEDURE — 76642 ULTRASOUND BREAST LIMITED: CPT | Performed by: FAMILY MEDICINE

## 2023-05-30 ENCOUNTER — LAB ENCOUNTER (OUTPATIENT)
Dept: LAB | Age: 43
End: 2023-05-30
Attending: FAMILY MEDICINE
Payer: COMMERCIAL

## 2023-05-30 ENCOUNTER — OFFICE VISIT (OUTPATIENT)
Dept: FAMILY MEDICINE CLINIC | Facility: CLINIC | Age: 43
End: 2023-05-30

## 2023-05-30 VITALS
RESPIRATION RATE: 16 BRPM | HEIGHT: 62.8 IN | DIASTOLIC BLOOD PRESSURE: 71 MMHG | WEIGHT: 151.81 LBS | BODY MASS INDEX: 26.9 KG/M2 | TEMPERATURE: 98 F | SYSTOLIC BLOOD PRESSURE: 108 MMHG | HEART RATE: 75 BPM

## 2023-05-30 DIAGNOSIS — Z83.438 FAMILY HISTORY OF HYPERLIPIDEMIA: ICD-10-CM

## 2023-05-30 DIAGNOSIS — Z00.00 ROUTINE PHYSICAL EXAMINATION: Primary | ICD-10-CM

## 2023-05-30 DIAGNOSIS — G89.29 CHRONIC BILATERAL LOW BACK PAIN WITHOUT SCIATICA: ICD-10-CM

## 2023-05-30 DIAGNOSIS — F41.9 ANXIETY: ICD-10-CM

## 2023-05-30 DIAGNOSIS — E53.8 VITAMIN B12 DEFICIENCY: ICD-10-CM

## 2023-05-30 DIAGNOSIS — M54.50 CHRONIC BILATERAL LOW BACK PAIN WITHOUT SCIATICA: ICD-10-CM

## 2023-05-30 DIAGNOSIS — E55.9 VITAMIN D DEFICIENCY: ICD-10-CM

## 2023-05-30 PROBLEM — O26.20: Status: RESOLVED | Noted: 2018-02-21 | Resolved: 2023-05-30

## 2023-05-30 PROBLEM — O26.22: Status: RESOLVED | Noted: 2018-07-30 | Resolved: 2023-05-30

## 2023-05-30 PROBLEM — O26.20 PREVIOUS RECURRENT MISCARRIAGES AFFECTING PREGNANCY, ANTEPARTUM: Status: RESOLVED | Noted: 2018-02-21 | Resolved: 2023-05-30

## 2023-05-30 PROBLEM — Z34.90 SUPERVISION OF NORMAL PREGNANCY (HCC): Status: RESOLVED | Noted: 2018-07-18 | Resolved: 2023-05-30

## 2023-05-30 PROBLEM — Z34.90 SUPERVISION OF NORMAL PREGNANCY: Status: RESOLVED | Noted: 2018-07-18 | Resolved: 2023-05-30

## 2023-05-30 PROBLEM — O26.22 PREGNANCY CARE OF HABITUAL ABORTER IN SECOND TRIMESTER: Status: RESOLVED | Noted: 2018-07-30 | Resolved: 2023-05-30

## 2023-05-30 PROCEDURE — 3074F SYST BP LT 130 MM HG: CPT | Performed by: FAMILY MEDICINE

## 2023-05-30 PROCEDURE — 99396 PREV VISIT EST AGE 40-64: CPT | Performed by: FAMILY MEDICINE

## 2023-05-30 PROCEDURE — 3078F DIAST BP <80 MM HG: CPT | Performed by: FAMILY MEDICINE

## 2023-05-30 PROCEDURE — 3008F BODY MASS INDEX DOCD: CPT | Performed by: FAMILY MEDICINE

## 2023-05-30 RX ORDER — ALPRAZOLAM 0.25 MG/1
0.25 TABLET ORAL 2 TIMES DAILY PRN
Qty: 30 TABLET | Refills: 1 | Status: SHIPPED | OUTPATIENT
Start: 2023-05-30 | End: 2023-05-30 | Stop reason: RX

## 2023-05-30 RX ORDER — CETIRIZINE HYDROCHLORIDE 10 MG/1
10 TABLET ORAL DAILY
COMMUNITY

## 2023-05-31 LAB
ALBUMIN/GLOBULIN RATIO: 1.7 (CALC) (ref 1–2.5)
ALBUMIN: 4.6 G/DL (ref 3.6–5.1)
ALKALINE PHOSPHATASE: 64 U/L (ref 31–125)
ALT: 9 U/L (ref 6–29)
AST: 14 U/L (ref 10–30)
BILIRUBIN, TOTAL: 0.5 MG/DL (ref 0.2–1.2)
BUN: 7 MG/DL (ref 7–25)
CALCIUM: 9.6 MG/DL (ref 8.6–10.2)
CARBON DIOXIDE: 27 MMOL/L (ref 20–32)
CHLORIDE: 102 MMOL/L (ref 98–110)
CHOL/HDLC RATIO: 2.5 (CALC)
CHOLESTEROL, TOTAL: 183 MG/DL
CREATININE: 0.57 MG/DL (ref 0.5–0.99)
EGFR: 116 ML/MIN/1.73M2
GLOBULIN: 2.7 G/DL (CALC) (ref 1.9–3.7)
GLUCOSE: 79 MG/DL (ref 65–99)
HDL CHOLESTEROL: 72 MG/DL
HEMATOCRIT: 38.2 % (ref 35–45)
HEMOGLOBIN: 13.3 G/DL (ref 11.7–15.5)
LDL-CHOLESTEROL: 94 MG/DL (CALC)
MCH: 32 PG (ref 27–33)
MCHC: 34.8 G/DL (ref 32–36)
MCV: 92 FL (ref 80–100)
NON-HDL CHOLESTEROL: 111 MG/DL (CALC)
POTASSIUM: 4 MMOL/L (ref 3.5–5.3)
PROTEIN, TOTAL: 7.3 G/DL (ref 6.1–8.1)
RDW: 12.7 % (ref 11–15)
RED BLOOD CELL COUNT: 4.15 MILLION/UL (ref 3.8–5.1)
SODIUM: 137 MMOL/L (ref 135–146)
TRIGLYCERIDES: 79 MG/DL
TSH: 1.88 MIU/L
VITAMIN B12: 397 PG/ML (ref 200–1100)
VITAMIN D, 25-OH, TOTAL: 18 NG/ML (ref 30–100)
WHITE BLOOD CELL COUNT: 5.9 THOUSAND/UL (ref 3.8–10.8)

## 2023-05-31 RX ORDER — ERGOCALCIFEROL 1.25 MG/1
50000 CAPSULE ORAL WEEKLY
Qty: 12 CAPSULE | Refills: 0 | Status: SHIPPED | OUTPATIENT
Start: 2023-05-31

## 2023-12-02 ENCOUNTER — HOSPITAL ENCOUNTER (OUTPATIENT)
Age: 43
Discharge: HOME | End: 2023-12-02
Payer: COMMERCIAL

## 2023-12-02 ENCOUNTER — APPOINTMENT (OUTPATIENT)
Dept: GENERAL RADIOLOGY | Age: 43
End: 2023-12-02
Attending: NURSE PRACTITIONER
Payer: COMMERCIAL

## 2023-12-02 VITALS
DIASTOLIC BLOOD PRESSURE: 85 MMHG | SYSTOLIC BLOOD PRESSURE: 122 MMHG | TEMPERATURE: 98 F | RESPIRATION RATE: 18 BRPM | OXYGEN SATURATION: 100 % | HEART RATE: 101 BPM

## 2023-12-02 DIAGNOSIS — R05.1 ACUTE COUGH: Primary | ICD-10-CM

## 2023-12-02 DIAGNOSIS — B34.9 VIRAL ILLNESS: ICD-10-CM

## 2023-12-02 LAB — S PYO AG THROAT QL: NEGATIVE

## 2023-12-02 PROCEDURE — 99213 OFFICE O/P EST LOW 20 MIN: CPT | Performed by: NURSE PRACTITIONER

## 2023-12-02 PROCEDURE — 87880 STREP A ASSAY W/OPTIC: CPT | Performed by: NURSE PRACTITIONER

## 2023-12-02 PROCEDURE — 71046 X-RAY EXAM CHEST 2 VIEWS: CPT | Performed by: NURSE PRACTITIONER

## 2023-12-02 RX ORDER — BENZONATATE 100 MG/1
100 CAPSULE ORAL 3 TIMES DAILY PRN
Qty: 30 CAPSULE | Refills: 0 | Status: SHIPPED | OUTPATIENT
Start: 2023-12-02 | End: 2024-01-01

## 2023-12-02 NOTE — ED INITIAL ASSESSMENT (HPI)
Pt came in due to cough, congestion, sore throat, and fatigue for the past few days. Pt has easy non labored respirations. Pt stated she took a few covid tests at home and it was negative. Pt stated she volun teers at a shelter and was exposed to tuberculosis.

## 2023-12-18 ENCOUNTER — OFFICE VISIT (OUTPATIENT)
Dept: FAMILY MEDICINE CLINIC | Facility: CLINIC | Age: 43
End: 2023-12-18
Payer: COMMERCIAL

## 2023-12-18 VITALS
OXYGEN SATURATION: 98 % | DIASTOLIC BLOOD PRESSURE: 79 MMHG | HEART RATE: 71 BPM | RESPIRATION RATE: 16 BRPM | HEIGHT: 62.8 IN | SYSTOLIC BLOOD PRESSURE: 113 MMHG | TEMPERATURE: 98 F | BODY MASS INDEX: 26.22 KG/M2 | WEIGHT: 148 LBS

## 2023-12-18 DIAGNOSIS — R05.9 COUGH, UNSPECIFIED TYPE: Primary | ICD-10-CM

## 2023-12-18 DIAGNOSIS — H91.93 BILATERAL HEARING LOSS, UNSPECIFIED HEARING LOSS TYPE: ICD-10-CM

## 2023-12-18 DIAGNOSIS — F41.9 ANXIETY: ICD-10-CM

## 2023-12-18 PROCEDURE — 3078F DIAST BP <80 MM HG: CPT | Performed by: FAMILY MEDICINE

## 2023-12-18 PROCEDURE — 99214 OFFICE O/P EST MOD 30 MIN: CPT | Performed by: FAMILY MEDICINE

## 2023-12-18 PROCEDURE — 3008F BODY MASS INDEX DOCD: CPT | Performed by: FAMILY MEDICINE

## 2023-12-18 PROCEDURE — 3074F SYST BP LT 130 MM HG: CPT | Performed by: FAMILY MEDICINE

## 2023-12-20 RX ORDER — ALPRAZOLAM 0.5 MG/1
0.25 TABLET ORAL 2 TIMES DAILY PRN
Qty: 15 TABLET | Refills: 1 | Status: SHIPPED | OUTPATIENT
Start: 2023-12-20

## 2023-12-20 NOTE — TELEPHONE ENCOUNTER
Pt would like a refill in Rx alprazolam 0.5MG for 15 tablets. Pt would like by tomorrow since she is leaving early out of town on 12/22/23. Pt was seen on 12/18/23        Current Outpatient Medications   Medication Sig Dispense Refill       0       0           ALPRAZolam 0.5 MG Oral Tab Take 0.5 tablets (0.25 mg total) by mouth 2 (two) times daily as needed.  (Patient not taking: Reported on 12/18/2023) 15 tablet 1

## 2023-12-20 NOTE — TELEPHONE ENCOUNTER
Dr. Leandra Meza     Please see message below     Medication has no protocol, med pended for your review/ approval

## 2023-12-21 ENCOUNTER — NURSE TRIAGE (OUTPATIENT)
Dept: FAMILY MEDICINE CLINIC | Facility: CLINIC | Age: 43
End: 2023-12-21

## 2023-12-21 RX ORDER — METHYLPREDNISOLONE 4 MG/1
TABLET ORAL
Qty: 1 EACH | Refills: 0 | Status: SHIPPED | OUTPATIENT
Start: 2023-12-21

## 2023-12-21 NOTE — TELEPHONE ENCOUNTER
Action Requested: Summary for Provider     []  Critical Lab, Recommendations Needed  [x] Need Additional Advice  []   FYI    [x]   Need Orders  [] Need Medications Sent to Pharmacy  []  Other     SUMMARY: Per Protocol disposition advised to be seen however patient is asking for a steroid Rx to have in case she needs because she is traveling for the next 2 weeks. Please send to [facility] pharmacy if appropriate. Reason for call: Neck Pain  Onset: 5 days ago    Patient (name and  verified) c/o neck pain, right side that started 5 days ago and is getting worse. Patient states that the pain is worse with movement. Patient is taking OTC advil for pain which helps for short duration. Patient states she has a hx of neck pain. Denies any numbness to extremities. Denies any other pain or traveling pain. Denies any headache. Denies any chest pain. Denies any fall or injury.       Reason for Disposition   MODERATE neck pain (e.g., interferes with normal activities like work or school)    Protocols used: Neck Pain or [id number]

## 2023-12-21 NOTE — TELEPHONE ENCOUNTER
Medrol dose pack sent in just in case. Tell her to do warm compresses and take regular anti-inflammatories.

## 2024-04-19 RX ORDER — ESCITALOPRAM OXALATE 5 MG/1
5 TABLET ORAL DAILY
Qty: 90 TABLET | Refills: 3 | Status: SHIPPED | OUTPATIENT
Start: 2024-04-19

## 2024-04-19 NOTE — TELEPHONE ENCOUNTER
Refill passed per [facility] protocol.     Requested Prescriptions   Pending Prescriptions Disp Refills    ESCITALOPRAM 5 MG Oral Tab [Pharmacy Med Name: ESCITALOPRAM 5 MG TABLET] 90 tablet 3     Sig: Take 1 tablet (5 mg total) by mouth daily.       Psychiatric Non-Scheduled (Anti-Anxiety) Passed - 4/19/2024 12:20 AM        Passed - In person appointment or virtual visit in the past 6 mos or appointment in next 3 mos     Recent Outpatient Visits              4 months ago Cough, unspecified type    [facility] [place], Colleen M, DO    Office Visit    10 months ago Routine physical examination    [facility] [place] Colleen M, DO    Office Visit    1 year ago Hoarseness of voice    [facility] [place], Colleen M, DO    Telemedicine    2 years ago Chronic cervical radiculopathy    [facility] A[place] Espinosa, DO    Office Visit    2 years ago Anxiety    [facility], [facility] [place], Colleen M, DO    Office Visit                      Passed - Depression Screening completed within the past 12 months

## 2024-04-26 NOTE — TELEPHONE ENCOUNTER
Refill passed per [facility] protocol.     Last office visit = 9/7/2021   Last refill = 9/30/02021    Requested Prescriptions   Pending Prescriptions Disp Refills    ESCITALOPRAM 5 MG Oral Tab [Pharmacy Med Name: ESCITALOPRAM 5MG TABLETS] 30 tablet 1
None

## 2024-06-21 NOTE — TELEPHONE ENCOUNTER
Health Call Center    Phone Message    May a detailed message be left on voicemail: yes     Reason for Call: Other: UTI   Patric is calling from Dr. Waldron's office (Neurology); and they are wondering if Dr. Aranda would be comfortable prescribing antibiotics to the pt for a UTI? States they can send over the recent lab work. Please call back to discuss. Thank you.     Action Taken: Other: Rheum    Travel Screening: Not Applicable     Date of Service:                                                                      STAT results for [facility] less than 0.6    Dr. Stephanie Manning informed, done in error.

## 2024-09-10 ENCOUNTER — TELEPHONE (OUTPATIENT)
Dept: FAMILY MEDICINE CLINIC | Facility: CLINIC | Age: 44
End: 2024-09-10

## 2024-09-10 DIAGNOSIS — Z12.31 ENCOUNTER FOR SCREENING MAMMOGRAM FOR MALIGNANT NEOPLASM OF BREAST: Primary | ICD-10-CM

## 2024-09-10 NOTE — TELEPHONE ENCOUNTER
Patient sent a [employer] message requesting an order :    Mammogram      Comments:   I would like to request a Mammogram referral. I believe this is needed as a follow up of a normal ultrasound, after an abnormal mammogram result.       Please advise.

## 2024-10-10 RX ORDER — ALPRAZOLAM 0.5 MG
0.25 TABLET ORAL 2 TIMES DAILY PRN
Qty: 15 TABLET | Refills: 0 | OUTPATIENT
Start: 2024-10-10

## 2024-10-16 ENCOUNTER — OFFICE VISIT (OUTPATIENT)
Dept: FAMILY MEDICINE CLINIC | Facility: CLINIC | Age: 44
End: 2024-10-16

## 2024-10-16 ENCOUNTER — LAB ENCOUNTER (OUTPATIENT)
Dept: LAB | Age: 44
End: 2024-10-16
Attending: FAMILY MEDICINE
Payer: COMMERCIAL

## 2024-10-16 VITALS
HEART RATE: 80 BPM | SYSTOLIC BLOOD PRESSURE: 98 MMHG | DIASTOLIC BLOOD PRESSURE: 64 MMHG | HEIGHT: 63 IN | WEIGHT: 154 LBS | BODY MASS INDEX: 27.29 KG/M2 | OXYGEN SATURATION: 98 % | TEMPERATURE: 98 F

## 2024-10-16 DIAGNOSIS — R19.8 RECTAL PRESSURE: ICD-10-CM

## 2024-10-16 DIAGNOSIS — Z00.00 ROUTINE PHYSICAL EXAMINATION: ICD-10-CM

## 2024-10-16 DIAGNOSIS — F41.9 ANXIETY: ICD-10-CM

## 2024-10-16 DIAGNOSIS — Z00.00 ROUTINE PHYSICAL EXAMINATION: Primary | ICD-10-CM

## 2024-10-16 LAB
ALBUMIN SERPL-MCNC: 4.6 G/DL (ref 3.2–4.8)
ALBUMIN/GLOB SERPL: 2 {RATIO} (ref 1–2)
ALP LIVER SERPL-CCNC: 66 U/L
ALT SERPL-CCNC: 8 U/L
ANION GAP SERPL CALC-SCNC: 5 MMOL/L (ref 0–18)
AST SERPL-CCNC: 13 U/L (ref ?–34)
BILIRUB SERPL-MCNC: 0.4 MG/DL (ref 0.3–1.2)
BUN BLD-MCNC: 8 MG/DL (ref 9–23)
BUN/CREAT SERPL: 12.3 (ref 10–20)
CALCIUM BLD-MCNC: 9.5 MG/DL (ref 8.7–10.4)
CHLORIDE SERPL-SCNC: 104 MMOL/L (ref 98–112)
CHOLEST SERPL-MCNC: 166 MG/DL (ref ?–200)
CO2 SERPL-SCNC: 30 MMOL/L (ref 21–32)
CREAT BLD-MCNC: 0.65 MG/DL
DEPRECATED RDW RBC AUTO: 46.5 FL (ref 35.1–46.3)
EGFRCR SERPLBLD CKD-EPI 2021: 112 ML/MIN/1.73M2 (ref 60–?)
ERYTHROCYTE [DISTWIDTH] IN BLOOD BY AUTOMATED COUNT: 13.4 % (ref 11–15)
FASTING PATIENT LIPID ANSWER: NO
FASTING STATUS PATIENT QL REPORTED: NO
GLOBULIN PLAS-MCNC: 2.3 G/DL (ref 2–3.5)
GLUCOSE BLD-MCNC: 90 MG/DL (ref 70–99)
HCT VFR BLD AUTO: 36.5 %
HDLC SERPL-MCNC: 73 MG/DL (ref 40–59)
HGB BLD-MCNC: 12 G/DL
LDLC SERPL CALC-MCNC: 78 MG/DL (ref ?–100)
MCH RBC QN AUTO: 31.3 PG (ref 26–34)
MCHC RBC AUTO-ENTMCNC: 32.9 G/DL (ref 31–37)
MCV RBC AUTO: 95.1 FL
NONHDLC SERPL-MCNC: 93 MG/DL (ref ?–130)
OSMOLALITY SERPL CALC.SUM OF ELEC: 286 MOSM/KG (ref 275–295)
PLATELET # BLD AUTO: 297 10(3)UL (ref 150–450)
POTASSIUM SERPL-SCNC: 3.7 MMOL/L (ref 3.5–5.1)
PROT SERPL-MCNC: 6.9 G/DL (ref 5.7–8.2)
RBC # BLD AUTO: 3.84 X10(6)UL
SODIUM SERPL-SCNC: 139 MMOL/L (ref 136–145)
TRIGL SERPL-MCNC: 82 MG/DL (ref 30–149)
TSI SER-ACNC: 2.03 MIU/ML (ref 0.55–4.78)
VIT D+METAB SERPL-MCNC: 26.5 NG/ML (ref 30–100)
VLDLC SERPL CALC-MCNC: 13 MG/DL (ref 0–30)
WBC # BLD AUTO: 7.5 X10(3) UL (ref 4–11)

## 2024-10-16 PROCEDURE — 36415 COLL VENOUS BLD VENIPUNCTURE: CPT

## 2024-10-16 PROCEDURE — 82306 VITAMIN D 25 HYDROXY: CPT

## 2024-10-16 PROCEDURE — 84443 ASSAY THYROID STIM HORMONE: CPT

## 2024-10-16 PROCEDURE — 90656 IIV3 VACC NO PRSV 0.5 ML IM: CPT | Performed by: FAMILY MEDICINE

## 2024-10-16 PROCEDURE — 99396 PREV VISIT EST AGE 40-64: CPT | Performed by: FAMILY MEDICINE

## 2024-10-16 PROCEDURE — 80061 LIPID PANEL: CPT

## 2024-10-16 PROCEDURE — 80053 COMPREHEN METABOLIC PANEL: CPT

## 2024-10-16 PROCEDURE — 90471 IMMUNIZATION ADMIN: CPT | Performed by: FAMILY MEDICINE

## 2024-10-16 PROCEDURE — 85027 COMPLETE CBC AUTOMATED: CPT

## 2024-10-16 RX ORDER — ESCITALOPRAM OXALATE 10 MG/1
10 TABLET ORAL DAILY
Qty: 90 TABLET | Refills: 3 | Status: SHIPPED | OUTPATIENT
Start: 2024-10-16

## 2024-10-16 RX ORDER — ESCITALOPRAM OXALATE 5 MG/1
10 TABLET ORAL DAILY
Qty: 180 TABLET | Refills: 3 | Status: SHIPPED | OUTPATIENT
Start: 2024-10-16 | End: 2024-10-16

## 2024-10-16 NOTE — TELEPHONE ENCOUNTER
Refill passed per [facility] protocols.    Requested Prescriptions   Pending Prescriptions Disp Refills    escitalopram 10 MG Oral Tab 90 tablet 3     Sig: Take 1 tablet (10 mg total) by mouth daily.       Psychiatric Non-Scheduled (Anti-Anxiety) Passed - 10/16/2024  3:46 PM        Passed - In person appointment or virtual visit in the past 6 mos or appointment in next 3 mos     Recent Outpatient Visits              Today Routine physical examination    [facility], [facility] Weiler, Colleen M, DO    Office Visit    10 months ago Cough, unspecified type    [facility], [facility] [place] We[place], Colleen M, DO    Office Visit    1 year ago Routine physical examination    [facility], [facility] [place] We[place], Colleen M, DO    Office Visit    2 years ago Hoarseness of voice    [facility] Weiler, Colleen M, DO    Telemedicine    2 years ago Chronic cervical radiculopathy    [facility] A[facility] Espinosa, DO    Office Visit          Future Appointments         Provider Department Appt Notes    In 2 weeks [address] Mammography - [place]                     Passed - Depression Screening completed within the past 12 months

## 2024-10-16 NOTE — PROGRESS NOTES
HPI:  43 yr old female who presents for physical.   with 2 daughters. Walks regularly and does yoga. Has been sweating more. Has been using Melatonin and magnesium to sleep.     Anxiety has improved. Takes occasional Alprazolam.  Questioning whether hormones plays a role. On Escitalopram 5mg daily.     Last pap was 2022.  HPV negative.  Normal periods.  Uses condoms.     Has noise in her right knee.  No pain. Not doing exercise.     Feels pressure in rectum for the past 2 months. No blood.  Usually after period. Never pain when she has a BM or blood in the stool. Stool has been a little thinner than normal. Feels it more when she is sitting. Normal appetite. No weight loss.     PMHx: reviewed, see chart  PSHx: reviewed, see chart  All: Spinach   Meds: see chart    ROS:   Allergic/Immuno:  Negative for environmental allergies and food allergies  Cardiovascular:  Negative for chest pain and irregular heartbeat/palpitations  Constitutional:  Negative for decreased activity, fever, irritability and lethargy  Endocrine:  Negative for abnormal sleep patterns, increased activity, polydipsia and polyphagia  ENMT:  Negative for ear drainage, hearing loss and nasal drainage  Eyes:  Negative for eye discharge and vision loss  Gastrointestinal:  Negative for abdominal pain, constipation, decreased appetite, diarrhea and vomiting  Genitourinary:  Negative for dysuria and hematuria  Hema/Lymph:  Negative for easy bleeding and easy bruising  Integumentary:  Negative for pruritus and rash  Musculoskeletal:  Negative for bone/joint symptoms  Neurological:  Negative for gait disturbance  Psychiatric:  Positive for anxiety    PE:  BP 98/64 (BP Location: Right arm, Patient Position: Sitting, Cuff Size: adult)   Pulse 80   Temp 97.7 °F (36.5 °C) (Temporal)   Ht 5' 3\" (1.6 m)   Wt 154 lb (69.9 kg)   LMP 10/03/2024 (Exact Date)   SpO2 98%   BMI 27.28 kg/m²   Gen:  Well-nourished.  No distress.  HEENT: Conjunctive clear.  Alex  ear canals clear.  Alex TMs intact with good landmarks noted.  Nares patent.  Oral mucous membrane moist.  Normal lips, teeth, and gums.  Oropharynx normal.  Neck supple.  Good ROM.  No LAD.  Thyroid normal.  CV:  Regular rate and rhythm; no murmurs  Lungs:  Clear to ausculation; good aeration               No wheezes, rales or rhonchi  Abd: soft, non-tender, non-distended          Normal bowel sounds; no masses          No hepatosplenomegaly  Breasts:  Normal appearance bilateral.  No masses or lesions noted.  Normal nipples bilateral.  No nipple discharge noted.  Normal lymph nodes.  Extremities: No cyanosis, clubbing, edema.  Pedal pulses 2+ alex.  Rectal: no abnormalities noted    A/P:  Encounter Diagnoses   Name Primary?    Routine physical examination    Encouraged exercise and healthy diet.  Labs done.  Flu shot given.    Yes    Anxiety    Increased.  Discussed increasing exercise.  Discussed HRT.  Increase Escitalopram to 10mg daily for now.        Rectal pressure    Normal exam. Will refer to GI if sensation continues.         Colleen Weiler, DO

## 2024-10-16 NOTE — TELEPHONE ENCOUNTER
escitalopram 5 MG Oral Tab, Take 2 tablets (10 mg total) by mouth daily., Disp: 180 tablet, Rfl: 3    PHARMACY COMMENTS: script clarification: increase to 10mg tab per insurance because twice daily dosing of 5mg is not covered.

## 2024-10-18 DIAGNOSIS — E55.9 VITAMIN D DEFICIENCY: Primary | ICD-10-CM

## 2024-10-18 RX ORDER — ERGOCALCIFEROL 1.25 MG/1
50000 CAPSULE, LIQUID FILLED ORAL WEEKLY
Qty: 8 CAPSULE | Refills: 0 | Status: SHIPPED | OUTPATIENT
Start: 2024-10-18 | End: 2024-11-17

## 2024-11-05 ENCOUNTER — HOSPITAL ENCOUNTER (OUTPATIENT)
Dept: MAMMOGRAPHY | Age: 44
Discharge: HOME | End: 2024-11-05
Attending: FAMILY MEDICINE
Payer: COMMERCIAL

## 2024-11-05 DIAGNOSIS — Z12.31 ENCOUNTER FOR SCREENING MAMMOGRAM FOR MALIGNANT NEOPLASM OF BREAST: ICD-10-CM

## 2024-11-05 PROCEDURE — 77067 SCR MAMMO BI INCL CAD: CPT | Performed by: FAMILY MEDICINE

## 2024-11-05 PROCEDURE — 77063 BREAST TOMOSYNTHESIS BI: CPT | Performed by: FAMILY MEDICINE

## 2024-11-11 ENCOUNTER — TELEPHONE (OUTPATIENT)
Dept: FAMILY MEDICINE CLINIC | Facility: CLINIC | Age: 44
End: 2024-11-11

## 2024-11-11 NOTE — TELEPHONE ENCOUNTER
Current Outpatient Medications:     ergocalciferol 1.25 MG (34521 UT) Oral Cap, Take 1 capsule (50,000 Units total) by mouth once a week., Disp: 8 capsule, Rfl: 0

## 2024-11-11 NOTE — TELEPHONE ENCOUNTER
Dear nursing staff,    Please call patient or send a mychart and relay Dr. Weiler's recommendation for Vitamin D.    Per lab result note by Dr. Weiler from 10/16/24:  -Vitamin D is low.  Take Vitamin D 50,000 IU weekly for the next 8 weeks.  After 8 weeks, switch to 2000 IU daily, which can be purchased over the counter.  We should plan to recheck the level in 3 months.         Component  Ref Range & Units 10/16/24  1:57 PM   Vitamin D, 25OH, Total  30.0 - 100.0 ng/mL 26.5 Low

## 2024-11-12 NOTE — TELEPHONE ENCOUNTER
11/11/24 [employer] message not yet read by patient. Called patient,verified name and date of birth. Patient reports that she received 4 capsules from her pharmacy instead of the 8 prescribed. Bottle says 1 refill is available so she will call  [facility] for that. Reviewed recommendations from Dr Weiler's 10/16/24 result note that after completing 8 weeks of 50,000 units vitamin d she should take Vitamin D 2000 units daily available over the counter. Then recheck vitamin D Level after the third month of starting vitamin D. Patient verbalizes understanding and agrees to plan of care.

## 2024-11-20 ENCOUNTER — TELEPHONE (OUTPATIENT)
Dept: FAMILY MEDICINE CLINIC | Facility: CLINIC | Age: 44
End: 2024-11-20

## 2024-11-20 DIAGNOSIS — Z01.84 IMMUNITY STATUS TESTING: Primary | ICD-10-CM

## 2024-11-20 NOTE — TELEPHONE ENCOUNTER
Patient stated that her spouse was diagnosed with shingles on 11/15/2024. Patient indicated that about 2 days ago woke up at 3am and noticed a hive on her arm. Patient splashed water on it and it went away. Wasn't sure if it was a spider bite or something else at night. Otherwise does not have any symptoms currently. Patient not sure if she had the chicken pox as a child. Patient is travelling to [place] on 11/27/2024 to visit her 101 year old grandmother and wants to make sure she is ok to visit her? Wanted to know if doctor can do blood work to see if she is immune to chicken pox or if she needs the shingles vaccine? Please advise.

## 2024-11-20 NOTE — TELEPHONE ENCOUNTER
Patient was called and inform of Dr.Weiler message below. Patient verbalized understanding.   Patient also mentioned that she called her previous provider inform her that in 2010 she had received 1 dose of varicella. Inform  patient she should still do the blood test. Patient agreed and she will get the blood work done.

## 2024-11-20 NOTE — TELEPHONE ENCOUNTER
She does not need the Shingles vaccine. Shingles is not contagious but chickenpox can be.  Usually chickenpox consists of fever and multiple, itchy lesions. I did place the order for a varicella titer. If she is not immune, I would recommend the Varicella vaccine, not the Shingles vaccine

## 2025-01-17 RX ORDER — ERGOCALCIFEROL 1.25 MG/1
50000 CAPSULE, LIQUID FILLED ORAL WEEKLY
Qty: 4 CAPSULE | Refills: 1 | OUTPATIENT
Start: 2025-01-17

## 2025-01-17 NOTE — TELEPHONE ENCOUNTER
Vitamin D lab note 10/16/2024:   -Vitamin D is low.  Take Vitamin D 50,000 IU weekly for the next 8 weeks.  After 8 weeks, switch to 2000 IU daily, which can be purchased over the counter.  We should plan to recheck the level in 3 months.

## 2025-02-04 ENCOUNTER — NURSE TRIAGE (OUTPATIENT)
Dept: FAMILY MEDICINE CLINIC | Facility: CLINIC | Age: 45
End: 2025-02-04

## 2025-02-04 DIAGNOSIS — J35.8 TONSIL STONE: Primary | ICD-10-CM

## 2025-02-04 NOTE — TELEPHONE ENCOUNTER
Spoke with patient (verified name and  ) , ENT referral information provided and cancelled the Thursday's appointment with DR Weiler.       Referred to Provider Information:  Provider Address Phone   Meri Luther MD [address] [place] [id number] [phone]         No future appointments.

## 2025-02-04 NOTE — TELEPHONE ENCOUNTER
DR Weiler =do you prefer ENT or keep the in office appointment on 2/6/25==see attached picture MyChart 1/23/25.         Action Requested: Summary for Provider     []  Critical Lab, Recommendations Needed  [x] Need Additional Advice  []   FYI    []   Need Orders  [] Need Medications Sent to Pharmacy  []  Other     SUMMARY: White spot back of her right throat since December .  She went to  [facility] immediate care in [place] on 2/1/25 and tested negative for strep.   She tried using a water pick  and gargling (see RN's advised via MyChart on 1/23/25), but it hasn't been effective.   Pain has reduced, she is grinding her teeth.   She was advised from the urgent care to see an ENT instead .   They did not attempt to remove the white spot .     Future Appointments   Date Time Provider Department Center   2/6/2025  3:15 PM Weiler, Colleen M, DO [facility]         Reason for call: Tonsil Problem  Onset: 2 months           Reason for Disposition   Weak immune system (e.g., HIV positive, cancer chemo, splenectomy, organ transplant, chronic steroids)    Protocols used: Mouth Symptoms-A-OH

## 2025-02-04 NOTE — TELEPHONE ENCOUNTER
DR Weiler =before we call the patient , do you want us to cancel her upcoming appointment on 2/6/25 ?

## 2025-02-05 ENCOUNTER — OFFICE VISIT (OUTPATIENT)
Dept: OTOLARYNGOLOGY | Facility: CLINIC | Age: 45
End: 2025-02-05

## 2025-02-05 VITALS — WEIGHT: 152.63 LBS | BODY MASS INDEX: 27 KG/M2

## 2025-02-05 DIAGNOSIS — M26.609 TMJ (TEMPOROMANDIBULAR JOINT DISORDER): ICD-10-CM

## 2025-02-05 DIAGNOSIS — J35.8 TONSILLAR CYST: Primary | ICD-10-CM

## 2025-02-05 PROCEDURE — 99203 OFFICE O/P NEW LOW 30 MIN: CPT | Performed by: STUDENT IN AN ORGANIZED HEALTH CARE EDUCATION/TRAINING PROGRAM
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
## 2025-05-08 DIAGNOSIS — F41.9 ANXIETY: ICD-10-CM

## 2025-05-08 NOTE — TELEPHONE ENCOUNTER
Please review. Protocol Failed; No Protocol      Recent fills: 1/17/2025  Last Rx written: 10/10/2024  Last office visit: 10/16/2024

## 2025-05-09 RX ORDER — ALPRAZOLAM 0.5 MG
0.25 TABLET ORAL 2 TIMES DAILY PRN
Qty: 15 TABLET | Refills: 0 | Status: SHIPPED | OUTPATIENT
Start: 2025-05-09

## (undated) DEVICE — CURRETTE 7MM CVD

## (undated) DEVICE — CANISTER SCT BTL SL CAP BRKLY

## (undated) DEVICE — STERILE POLYISOPRENE POWDER-FREE SURGICAL GLOVES: Brand: PROTEXIS

## (undated) DEVICE — CANISTER SAFETOUCH SYST DISP

## (undated) DEVICE — D AND C PACK: Brand: MEDLINE INDUSTRIES, INC.

## (undated) DEVICE — COVER SGL STRL LGHT HNDL BLU

## (undated) DEVICE — SOL  .9 1000ML BTL

## (undated) DEVICE — STIRRUP STRAP W/SLING RING

## (undated) DEVICE — SUCTION CANISTER, 3000CC,SAFELINER: Brand: DEROYAL

## (undated) DEVICE — STERILE LATEX POWDER-FREE SURGICAL GLOVESWITH NITRILE COATING: Brand: PROTEXIS

## (undated) DEVICE — TUBING SUCTION COLLECTION SET

## (undated) NOTE — MR AVS SNAPSHOT
[facility]  [address], [address]  [phone]               Thank you for choosing us for your health care visit with Torrie Painting.  Cindy Hudson MD.  We are glad to serve you and happy to provide you with this summa discharge instructions in [employer] by going to Visits < Admission Summaries. If you've been to the Emergency Department or your doctor's office, you can view your past visit information in [employer] by going to Visits < Visit Summaries. RefleXion Medical questions?

## (undated) NOTE — IP AVS SNAPSHOT
[facility]    [address], [place], [place] ~ [phone]                Discharge Summary   3/8/2017    Glenetta Boast           Admission Information        Provider Department    3/8/2017 Jose Carlos Dang MD [facility] Pre-Op R · A fever over 100.4°F (38.0°C)  · Increasing abdominal pain or tenderness  · Foul-smelling discharge          30 Days    After Your Surgery    [facility]'s commitment to quality care does not end  when you leave the hospital    We are gathering inf · For you to have a sore throat if you had a breathing tube during surgery (while you were asleep!). The sore throat should get better within 48 hours.  You can gargle with warm salt water (1/2 tsp in 4 oz warm water) or use a throat lozenge for comfort  · 7.8 (03/07/17)  4.39 (03/07/17)  14.0 (03/07/17)  40.8 (03/07/17)  92.8 -- -- -- (03/07/17)  237 (03/07/17)  9.2      Recent Hematology Lab Results (cont.)  (Last 3 results in the past 90 days)    Neutrophil % Lymphocyte % Monocyte % Eosinophil % Basophil Summaries. If you've been to the Emergency Department or your doctor's office, you can view your past visit information in [employer] by going to Visits < Visit Summaries. [employer] questions? Call [phone] for help.   [employer] is NOT to be used for urge

## (undated) NOTE — Clinical Note
Level 2 US at [facility] Monthly growth US in 3rd trimester Weekly NST at 34wks Stop Metformin Wean off Prednisone over next week Wean off prometrium over next week Continue Lovenox. We discussed stopping it and continuing aspirin but they wish to continue it.

## (undated) NOTE — LETTER
6/13/2018          To Whom It May Concern:    Pranav Sampson is currently under my medical care and has permission to get a massage. If you require additional information please contact our office.         Sincerely,    MD Ching Medley

## (undated) NOTE — LETTER
Date & Time: 12/14/2022, 2:31 PM  Patient: Errol San  Encounter Provider(s):    JOSE Golden       To Whom It May Concern:    Reji Bah was seen and treated in our department on 12/14/2022. She should be excused from work until she is free from fever for 24 hours.      If you have any questions or concerns, please do not hesitate to call.        _____________________________  Physician/APC Signature

## (undated) NOTE — LETTER
8/30/2017              [facility] [address] [id number]         To Whom It May Concern,   Our patient Nadine Justin is being cared for by our OB/GYN department.  Please excuse her off of work for the remainder of the week due

## (undated) NOTE — MR AVS SNAPSHOT
[facility]  [address], [address]  [phone]               Thank you for choosing us for your health care visit with Guille Terrazas.  Kelly Saldivar MD.  We are glad to serve you and happy to provide you with this summa 92/60 mmHg 155 lb (70.308 kg) No            Current Medications          This list is accurate as of: 5/8/17  6:10 PM.  Always use your most recent med list.                HYDROcodone-acetaminophen 5-325 MG Tabs   Take 1 tablet by mouth every 6 (six) myesha

## (undated) NOTE — MR AVS SNAPSHOT
[facility]  [address], [address]  [phone]               Thank you for choosing us for your health care visit with Emelia Runner, MD.  We are glad to serve you and happy to provide you with this summary This list is accurate as of: 3/27/17 10:12 AM.  Always use your most recent med list.                HYDROcodone-acetaminophen 5-325 MG Tabs   Take 1 tablet by mouth every 6 (six) hours as needed for Pain.    Commonly known as:  NORCO           ibuprofen 60 Tips for increasing your physical activity – Adults who are physically active are less likely to develop some chronic diseases than adults who are inactive.      HOW TO GET STARTED: HOW TO STAY MOTIVATED:   Start activities slowly and build up over time D

## (undated) NOTE — LETTER
5/22/2019              [facility] [address] [address]         Dear Melissa Jewell,    It was a pleasure to see you. Your pap was normal.  There is no need for further testing at this time.   I look forward to seeing you at your

## (undated) NOTE — MR AVS SNAPSHOT
[facility]  [address] [place] [address] [phone]               Thank you for choosing us for your health care visit with Deon Gayle.  Galilea Alvarez MD.  We are glad to serve you and happy to provide you with this summary of your visit office, you can view your past visit information in [employer]elo by going to Visits < Visit Summaries. [employer]elo questions? Call [phone] for help. [employer] is NOT to be used for urgent needs. For medical emergencies, dial 911.            Visit [facility]

## (undated) NOTE — LETTER
6/13/2018          To Whom It May Concern:    Laci Ruiz is currently under my medical care and will benefit from working at home due to back pain. If you require additional information please contact our office.         Sincerely,    Melanie Woodard,

## (undated) NOTE — Clinical Note
Level 2 US at [facility] Monthly growth US in 3rd trimester Weekly NST at 34wks Continue Lovenox.

## (undated) NOTE — Clinical Note
Weekly NST at 34 weeks Continue Lovenox, consider changing to unfractionated heparin at 36-37 weeks (5000 units every 12 hours) Delivery is recommended by 40 weeks

## (undated) NOTE — MR AVS SNAPSHOT
[facility]  [address] [place] [address]  [phone]               Thank you for choosing us for your health care visit with Alysha Krishna.  Dora Aviles MD.  We are glad to serve you and happy to provide you with this summary of your visit If you've recently had a stay at the Hospital you can access your discharge instructions in [employer] by going to Visits < Admission Summaries.  If you've been to the Emergency Department or your doctor's office, you can view your past visit information in My